# Patient Record
Sex: FEMALE | Race: OTHER | HISPANIC OR LATINO
[De-identification: names, ages, dates, MRNs, and addresses within clinical notes are randomized per-mention and may not be internally consistent; named-entity substitution may affect disease eponyms.]

---

## 2020-08-19 PROBLEM — Z00.00 ENCOUNTER FOR PREVENTIVE HEALTH EXAMINATION: Status: ACTIVE | Noted: 2020-08-19

## 2020-08-20 ENCOUNTER — APPOINTMENT (OUTPATIENT)
Dept: INTERVENTIONAL RADIOLOGY/VASCULAR | Facility: CLINIC | Age: 35
End: 2020-08-20
Payer: COMMERCIAL

## 2020-08-20 ENCOUNTER — OUTPATIENT (OUTPATIENT)
Dept: OUTPATIENT SERVICES | Facility: HOSPITAL | Age: 35
LOS: 1 days | End: 2020-08-20

## 2020-08-20 DIAGNOSIS — Z00.8 ENCOUNTER FOR OTHER GENERAL EXAMINATION: ICD-10-CM

## 2020-08-20 PROCEDURE — 58340 CATHETER FOR HYSTEROGRAPHY: CPT

## 2020-08-20 PROCEDURE — 74740 X-RAY FEMALE GENITAL TRACT: CPT | Mod: 26

## 2021-05-11 ENCOUNTER — APPOINTMENT (OUTPATIENT)
Dept: HUMAN REPRODUCTION | Facility: CLINIC | Age: 36
End: 2021-05-11

## 2021-11-18 ENCOUNTER — APPOINTMENT (OUTPATIENT)
Dept: HUMAN REPRODUCTION | Facility: CLINIC | Age: 36
End: 2021-11-18
Payer: COMMERCIAL

## 2021-11-18 PROCEDURE — 76830 TRANSVAGINAL US NON-OB: CPT

## 2021-11-18 PROCEDURE — 36415 COLL VENOUS BLD VENIPUNCTURE: CPT

## 2021-11-18 PROCEDURE — 99205 OFFICE O/P NEW HI 60 MIN: CPT | Mod: 25

## 2021-11-24 ENCOUNTER — APPOINTMENT (OUTPATIENT)
Dept: HUMAN REPRODUCTION | Facility: CLINIC | Age: 36
End: 2021-11-24

## 2021-12-18 ENCOUNTER — RESULT REVIEW (OUTPATIENT)
Age: 36
End: 2021-12-18

## 2021-12-18 ENCOUNTER — APPOINTMENT (OUTPATIENT)
Dept: MRI IMAGING | Facility: CLINIC | Age: 36
End: 2021-12-18
Payer: COMMERCIAL

## 2021-12-18 ENCOUNTER — OUTPATIENT (OUTPATIENT)
Dept: OUTPATIENT SERVICES | Facility: HOSPITAL | Age: 36
LOS: 1 days | End: 2021-12-18
Payer: COMMERCIAL

## 2021-12-18 DIAGNOSIS — Z00.8 ENCOUNTER FOR OTHER GENERAL EXAMINATION: ICD-10-CM

## 2021-12-18 PROCEDURE — 70553 MRI BRAIN STEM W/O & W/DYE: CPT

## 2021-12-18 PROCEDURE — 70553 MRI BRAIN STEM W/O & W/DYE: CPT | Mod: 26

## 2021-12-18 PROCEDURE — A9585: CPT

## 2022-01-03 ENCOUNTER — APPOINTMENT (OUTPATIENT)
Dept: HUMAN REPRODUCTION | Facility: CLINIC | Age: 37
End: 2022-01-03
Payer: COMMERCIAL

## 2022-01-03 PROCEDURE — 36415 COLL VENOUS BLD VENIPUNCTURE: CPT

## 2022-01-06 ENCOUNTER — APPOINTMENT (OUTPATIENT)
Dept: HUMAN REPRODUCTION | Facility: CLINIC | Age: 37
End: 2022-01-06
Payer: COMMERCIAL

## 2022-01-06 PROCEDURE — 58340 CATHETER FOR HYSTEROGRAPHY: CPT

## 2022-01-06 PROCEDURE — 99072 ADDL SUPL MATRL&STAF TM PHE: CPT

## 2022-01-06 PROCEDURE — 76831 ECHO EXAM UTERUS: CPT

## 2022-01-06 PROCEDURE — 58999I: CUSTOM

## 2022-01-10 ENCOUNTER — TRANSCRIPTION ENCOUNTER (OUTPATIENT)
Age: 37
End: 2022-01-10

## 2022-01-14 ENCOUNTER — APPOINTMENT (OUTPATIENT)
Dept: MRI IMAGING | Facility: CLINIC | Age: 37
End: 2022-01-14

## 2022-01-25 ENCOUNTER — APPOINTMENT (OUTPATIENT)
Dept: MRI IMAGING | Facility: CLINIC | Age: 37
End: 2022-01-25
Payer: COMMERCIAL

## 2022-01-25 ENCOUNTER — RESULT REVIEW (OUTPATIENT)
Age: 37
End: 2022-01-25

## 2022-01-25 ENCOUNTER — OUTPATIENT (OUTPATIENT)
Dept: OUTPATIENT SERVICES | Facility: HOSPITAL | Age: 37
LOS: 1 days | End: 2022-01-25
Payer: COMMERCIAL

## 2022-01-25 DIAGNOSIS — Z00.8 ENCOUNTER FOR OTHER GENERAL EXAMINATION: ICD-10-CM

## 2022-01-25 PROCEDURE — 72197 MRI PELVIS W/O & W/DYE: CPT

## 2022-01-25 PROCEDURE — A9585: CPT

## 2022-01-25 PROCEDURE — 72197 MRI PELVIS W/O & W/DYE: CPT | Mod: 26

## 2022-02-04 ENCOUNTER — APPOINTMENT (OUTPATIENT)
Dept: OBGYN | Facility: CLINIC | Age: 37
End: 2022-02-04
Payer: COMMERCIAL

## 2022-02-04 VITALS
HEIGHT: 62 IN | DIASTOLIC BLOOD PRESSURE: 62 MMHG | SYSTOLIC BLOOD PRESSURE: 112 MMHG | BODY MASS INDEX: 24.66 KG/M2 | WEIGHT: 134 LBS

## 2022-02-04 DIAGNOSIS — Z87.42 PERSONAL HISTORY OF OTHER DISEASES OF THE FEMALE GENITAL TRACT: ICD-10-CM

## 2022-02-04 DIAGNOSIS — Z86.2 PERSONAL HISTORY OF DISEASES OF THE BLOOD AND BLOOD-FORMING ORGANS AND CERTAIN DISORDERS INVOLVING THE IMMUNE MECHANISM: ICD-10-CM

## 2022-02-04 DIAGNOSIS — Z86.018 PERSONAL HISTORY OF OTHER BENIGN NEOPLASM: ICD-10-CM

## 2022-02-04 PROCEDURE — 99243 OFF/OP CNSLTJ NEW/EST LOW 30: CPT

## 2022-02-04 RX ORDER — CABERGOLINE 0.5 MG/1
TABLET ORAL
Refills: 0 | Status: ACTIVE | COMMUNITY

## 2022-02-04 NOTE — HISTORY OF PRESENT ILLNESS
[FreeTextEntry1] : presents for consultation for fibroid uterus. has been undergoing fertility treatments for over a year and has had multiple procedures for her fibroids. \par \par \par 2015 open cystectomy\par 2019 open myomectomy x 10 fibroids - 5 large, 5 small\par 2021 x2 [facility] sm[facility]  dr willard\par 2021 [facility] and robotic surgery - fibroids and endo  dr. lyubov HARRELLU [facility]\par \par now with 3.6 sm fibroid on mri and post surgical changes\par \par \par \par  [TextBox_4] : Consult regarding fibroids [PapSmeardate] : 7/2020 [LMPDate] : 1/3/22

## 2022-02-04 NOTE — PLAN
[FreeTextEntry1] : Discussed the option of continued conservative observation of fibroids vs surgical removal. Treatment options of myomectomy, hysterectomy, ufe and continued observation were also outlined. A detailed discussion regarding the option of myomectomy vs hysterectomy was also held and the risks, benefits, and alternatives provided. All questions answered and pt to notify.\par \par will d/w brandon\par has a small prolactinoma and started cabergoline \par has had no pregnancies in the past

## 2022-02-09 ENCOUNTER — NON-APPOINTMENT (OUTPATIENT)
Age: 37
End: 2022-02-09

## 2022-02-22 ENCOUNTER — OUTPATIENT (OUTPATIENT)
Dept: OUTPATIENT SERVICES | Facility: HOSPITAL | Age: 37
LOS: 1 days | End: 2022-02-22
Payer: COMMERCIAL

## 2022-02-22 VITALS
HEART RATE: 75 BPM | OXYGEN SATURATION: 100 % | HEIGHT: 61 IN | SYSTOLIC BLOOD PRESSURE: 115 MMHG | WEIGHT: 130.07 LBS | TEMPERATURE: 98 F | DIASTOLIC BLOOD PRESSURE: 76 MMHG | RESPIRATION RATE: 20 BRPM

## 2022-02-22 DIAGNOSIS — D25.9 LEIOMYOMA OF UTERUS, UNSPECIFIED: ICD-10-CM

## 2022-02-22 DIAGNOSIS — Z98.890 OTHER SPECIFIED POSTPROCEDURAL STATES: Chronic | ICD-10-CM

## 2022-02-22 DIAGNOSIS — Z01.818 ENCOUNTER FOR OTHER PREPROCEDURAL EXAMINATION: ICD-10-CM

## 2022-02-22 DIAGNOSIS — D64.9 ANEMIA, UNSPECIFIED: ICD-10-CM

## 2022-02-22 DIAGNOSIS — Z90.710 ACQUIRED ABSENCE OF BOTH CERVIX AND UTERUS: Chronic | ICD-10-CM

## 2022-02-22 LAB
ANION GAP SERPL CALC-SCNC: 12 MMOL/L — SIGNIFICANT CHANGE UP (ref 5–17)
BLD GP AB SCN SERPL QL: NEGATIVE — SIGNIFICANT CHANGE UP
BUN SERPL-MCNC: 7 MG/DL — SIGNIFICANT CHANGE UP (ref 7–23)
CALCIUM SERPL-MCNC: 9.6 MG/DL — SIGNIFICANT CHANGE UP (ref 8.4–10.5)
CHLORIDE SERPL-SCNC: 105 MMOL/L — SIGNIFICANT CHANGE UP (ref 96–108)
CO2 SERPL-SCNC: 23 MMOL/L — SIGNIFICANT CHANGE UP (ref 22–31)
CREAT SERPL-MCNC: 0.69 MG/DL — SIGNIFICANT CHANGE UP (ref 0.5–1.3)
GLUCOSE SERPL-MCNC: 107 MG/DL — HIGH (ref 70–99)
HCT VFR BLD CALC: 42.4 % — SIGNIFICANT CHANGE UP (ref 34.5–45)
HGB BLD-MCNC: 13.8 G/DL — SIGNIFICANT CHANGE UP (ref 11.5–15.5)
MCHC RBC-ENTMCNC: 28.8 PG — SIGNIFICANT CHANGE UP (ref 27–34)
MCHC RBC-ENTMCNC: 32.5 GM/DL — SIGNIFICANT CHANGE UP (ref 32–36)
MCV RBC AUTO: 88.3 FL — SIGNIFICANT CHANGE UP (ref 80–100)
NRBC # BLD: 0 /100 WBCS — SIGNIFICANT CHANGE UP (ref 0–0)
PLATELET # BLD AUTO: 202 K/UL — SIGNIFICANT CHANGE UP (ref 150–400)
POTASSIUM SERPL-MCNC: 4 MMOL/L — SIGNIFICANT CHANGE UP (ref 3.5–5.3)
POTASSIUM SERPL-SCNC: 4 MMOL/L — SIGNIFICANT CHANGE UP (ref 3.5–5.3)
RBC # BLD: 4.8 M/UL — SIGNIFICANT CHANGE UP (ref 3.8–5.2)
RBC # FLD: 16.6 % — HIGH (ref 10.3–14.5)
RH IG SCN BLD-IMP: POSITIVE — SIGNIFICANT CHANGE UP
SODIUM SERPL-SCNC: 140 MMOL/L — SIGNIFICANT CHANGE UP (ref 135–145)
WBC # BLD: 4.76 K/UL — SIGNIFICANT CHANGE UP (ref 3.8–10.5)
WBC # FLD AUTO: 4.76 K/UL — SIGNIFICANT CHANGE UP (ref 3.8–10.5)

## 2022-02-22 PROCEDURE — 80048 BASIC METABOLIC PNL TOTAL CA: CPT

## 2022-02-22 PROCEDURE — 86850 RBC ANTIBODY SCREEN: CPT

## 2022-02-22 PROCEDURE — G0463: CPT

## 2022-02-22 PROCEDURE — 86900 BLOOD TYPING SEROLOGIC ABO: CPT

## 2022-02-22 PROCEDURE — 36415 COLL VENOUS BLD VENIPUNCTURE: CPT

## 2022-02-22 PROCEDURE — 85027 COMPLETE CBC AUTOMATED: CPT

## 2022-02-22 PROCEDURE — 86901 BLOOD TYPING SEROLOGIC RH(D): CPT

## 2022-02-22 RX ORDER — SODIUM CHLORIDE 9 MG/ML
1000 INJECTION, SOLUTION INTRAVENOUS
Refills: 0 | Status: DISCONTINUED | OUTPATIENT
Start: 2022-03-15 | End: 2022-03-29

## 2022-02-22 RX ORDER — LIDOCAINE HCL 20 MG/ML
0.2 VIAL (ML) INJECTION ONCE
Refills: 0 | Status: DISCONTINUED | OUTPATIENT
Start: 2022-03-15 | End: 2022-03-29

## 2022-02-22 NOTE — H&P PST ADULT - HISTORY OF PRESENT ILLNESS
36 year old female with h/o uterine fibroid, s/p laparoscopic procedure for  36 year old female with h/o uterine fibroid, s/p laparoscopic hysteroscopy for fibroid resection 10/2021, states "they grew back," presents for preop testing today for D&C/ non obstetrical operative hysteroscopy/ resection of submucous fibroid on 3/15/2022. Patient c/o mild lower back pain, no abdominal discomfort, no n/v.

## 2022-02-22 NOTE — H&P PST ADULT - PROBLEM SELECTOR PLAN 1
Scheduled for D&C/ non obstetrical / operative hysteroscopy/ resection of submucous fibroid   preop instruction discussed and patient verbalized understanding   ucg on admit, urine cup given

## 2022-02-22 NOTE — H&P PST ADULT - NSICDXPASTSURGICALHX_GEN_ALL_CORE_FT
PAST SURGICAL HISTORY:  H/O ovarian cystectomy 10/2019    H/O: hysterectomy 3/2021  5/2021    S/P laparoscopic procedure for fibroid 10/2021

## 2022-02-22 NOTE — H&P PST ADULT - ATTENDING COMMENTS
pt seen and plan discussed. to proceed with scheduled procedure. all questions answered. informed consent signed and witnessed.    gideon rose

## 2022-02-22 NOTE — H&P PST ADULT - NSICDXPASTMEDICALHX_GEN_ALL_CORE_FT
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

## 2022-02-22 NOTE — H&P PST ADULT - AGENT'S NAME
1. Have you been to the ER, urgent care clinic since your last visit? Hospitalized since your last visit?no    2. Have you seen or consulted any other health care providers outside of the [address] [place] [address] since your last visit? Include any pap smears or colon screening. no           Body Mass Index: Care Instructions  Your Care Instructions    Body mass index (BMI) can help you see if your weight is raising your risk for health problems. It uses a formula to compare how much you weigh with how tall you are. · A BMI lower than 18.5 is considered underweight. · A BMI between 18.5 and 24.9 is considered healthy. · A BMI between 25 and 29.9 is considered overweight. A BMI of 30 or higher is considered obese. If your BMI is in the normal range, it means that you have a lower risk for weight-related health problems. If your BMI is in the overweight or obese range, you may be at increased risk for weight-related health problems, such as high blood pressure, heart disease, stroke, arthritis or joint pain, and diabetes. If your BMI is in the underweight range, you may be at increased risk for health problems such as fatigue, lower protection (immunity) against illness, muscle loss, bone loss, hair loss, and hormone problems. BMI is just one measure of your risk for weight-related health problems. You may be at higher risk for health problems if you are not active, you eat an unhealthy diet, or you drink too much alcohol or use tobacco products. Follow-up care is a key part of your treatment and safety. Be sure to make and go to all appointments, and call your doctor if you are having problems. It's also a good idea to know your test results and keep a list of the medicines you take. How can you care for yourself at home? · Practice healthy eating habits. This includes eating plenty of fruits, vegetables, whole grains, lean protein, and low-fat dairy. · If your doctor recommends it, get more exercise. Walking is a good choice. Bit by bit, increase the amount you walk every day. Try for at least 30 minutes on most days of the week. · Do not smoke. Smoking can increase your risk for health problems. If you need help quitting, talk to your doctor about stop-smoking programs and medicines. These can increase your chances of quitting for good. · Limit alcohol to 2 drinks a day for men and 1 drink a day for women. Too much alcohol can cause health problems. If you have a BMI higher than 25  · Your doctor may do other tests to check your risk for weight-related health problems. This may include measuring the distance around your waist. A waist measurement of more than 40 inches in men or 35 inches in women can increase the risk of weight-related health problems. · Talk with your doctor about steps you can take to stay healthy or improve your health. You may need to make lifestyle changes to lose weight and stay healthy, such as changing your diet and getting regular exercise. If you have a BMI lower than 18.5  · Your doctor may do other tests to check your risk for health problems. · Talk with your doctor about steps you can take to stay healthy or improve your health. You may need to make lifestyle changes to gain or maintain weight and stay healthy, such as getting more healthy foods in your diet and doing exercises to build muscle. Where can you learn more? Go to http://ang-rebecca.info/. Enter S176 in the search box to learn more about \"Body Mass Index: Care Instructions. \"  Current as of: October 13, 2016  Content Version: 11.4  © 7212-2387 [employer]. Care instructions adapted under license by [employer] (which disclaims liability or warranty for this information).  If you have questions about a medical condition or this instruction, always ask your healthcare professional. [place] [address] any warranty or liability for your use of this information. Martell Ramos

## 2022-02-25 PROBLEM — E23.6 OTHER DISORDERS OF PITUITARY GLAND: Chronic | Status: ACTIVE | Noted: 2022-02-22

## 2022-02-25 PROBLEM — D64.9 ANEMIA, UNSPECIFIED: Chronic | Status: ACTIVE | Noted: 2022-02-22

## 2022-02-25 PROBLEM — D25.9 LEIOMYOMA OF UTERUS, UNSPECIFIED: Chronic | Status: ACTIVE | Noted: 2022-02-22

## 2022-03-04 ENCOUNTER — APPOINTMENT (OUTPATIENT)
Dept: OBGYN | Facility: CLINIC | Age: 37
End: 2022-03-04

## 2022-03-12 ENCOUNTER — OUTPATIENT (OUTPATIENT)
Dept: OUTPATIENT SERVICES | Facility: HOSPITAL | Age: 37
LOS: 1 days | End: 2022-03-12
Payer: COMMERCIAL

## 2022-03-12 DIAGNOSIS — Z11.52 ENCOUNTER FOR SCREENING FOR COVID-19: ICD-10-CM

## 2022-03-12 DIAGNOSIS — Z98.890 OTHER SPECIFIED POSTPROCEDURAL STATES: Chronic | ICD-10-CM

## 2022-03-12 DIAGNOSIS — Z90.710 ACQUIRED ABSENCE OF BOTH CERVIX AND UTERUS: Chronic | ICD-10-CM

## 2022-03-12 LAB — SARS-COV-2 RNA SPEC QL NAA+PROBE: SIGNIFICANT CHANGE UP

## 2022-03-12 PROCEDURE — U0005: CPT

## 2022-03-12 PROCEDURE — U0003: CPT

## 2022-03-12 PROCEDURE — C9803: CPT

## 2022-03-14 ENCOUNTER — TRANSCRIPTION ENCOUNTER (OUTPATIENT)
Age: 37
End: 2022-03-14

## 2022-03-15 ENCOUNTER — OUTPATIENT (OUTPATIENT)
Dept: OUTPATIENT SERVICES | Facility: HOSPITAL | Age: 37
LOS: 1 days | End: 2022-03-15
Payer: COMMERCIAL

## 2022-03-15 ENCOUNTER — RESULT REVIEW (OUTPATIENT)
Age: 37
End: 2022-03-15

## 2022-03-15 ENCOUNTER — APPOINTMENT (OUTPATIENT)
Dept: OBGYN | Facility: HOSPITAL | Age: 37
End: 2022-03-15

## 2022-03-15 VITALS
OXYGEN SATURATION: 100 % | DIASTOLIC BLOOD PRESSURE: 75 MMHG | TEMPERATURE: 98 F | RESPIRATION RATE: 15 BRPM | SYSTOLIC BLOOD PRESSURE: 108 MMHG | WEIGHT: 130.07 LBS | HEIGHT: 61 IN | HEART RATE: 83 BPM

## 2022-03-15 VITALS
DIASTOLIC BLOOD PRESSURE: 70 MMHG | TEMPERATURE: 97 F | SYSTOLIC BLOOD PRESSURE: 111 MMHG | OXYGEN SATURATION: 100 % | HEART RATE: 106 BPM | RESPIRATION RATE: 14 BRPM

## 2022-03-15 DIAGNOSIS — Z98.890 OTHER SPECIFIED POSTPROCEDURAL STATES: Chronic | ICD-10-CM

## 2022-03-15 DIAGNOSIS — Z90.710 ACQUIRED ABSENCE OF BOTH CERVIX AND UTERUS: Chronic | ICD-10-CM

## 2022-03-15 DIAGNOSIS — D25.9 LEIOMYOMA OF UTERUS, UNSPECIFIED: ICD-10-CM

## 2022-03-15 LAB — RH IG SCN BLD-IMP: POSITIVE — SIGNIFICANT CHANGE UP

## 2022-03-15 PROCEDURE — 58561 HYSTEROSCOPY REMOVE MYOMA: CPT

## 2022-03-15 PROCEDURE — 88305 TISSUE EXAM BY PATHOLOGIST: CPT | Mod: 26

## 2022-03-15 PROCEDURE — 88305 TISSUE EXAM BY PATHOLOGIST: CPT

## 2022-03-15 RX ORDER — OXYCODONE HYDROCHLORIDE 5 MG/1
5 TABLET ORAL ONCE
Refills: 0 | Status: DISCONTINUED | OUTPATIENT
Start: 2022-03-15 | End: 2022-03-15

## 2022-03-15 RX ORDER — CHOLECALCIFEROL (VITAMIN D3) 125 MCG
1 CAPSULE ORAL
Qty: 0 | Refills: 0 | DISCHARGE

## 2022-03-15 RX ORDER — IBUPROFEN 200 MG
600 TABLET ORAL ONCE
Refills: 0 | Status: COMPLETED | OUTPATIENT
Start: 2022-03-15 | End: 2022-03-15

## 2022-03-15 RX ORDER — APREPITANT 80 MG/1
40 CAPSULE ORAL ONCE
Refills: 0 | Status: COMPLETED | OUTPATIENT
Start: 2022-03-15 | End: 2022-03-15

## 2022-03-15 RX ORDER — ONDANSETRON 8 MG/1
4 TABLET, FILM COATED ORAL ONCE
Refills: 0 | Status: DISCONTINUED | OUTPATIENT
Start: 2022-03-15 | End: 2022-03-29

## 2022-03-15 RX ORDER — PROGESTERONE 200 MG/1
1 CAPSULE, LIQUID FILLED ORAL
Qty: 7 | Refills: 0
Start: 2022-03-15 | End: 2022-03-21

## 2022-03-15 RX ADMIN — APREPITANT 40 MILLIGRAM(S): 80 CAPSULE ORAL at 14:55

## 2022-03-15 RX ADMIN — Medication 600 MILLIGRAM(S): at 17:33

## 2022-03-15 NOTE — ASU PATIENT PROFILE, ADULT - VISION (WITH CORRECTIVE LENSES IF THE PATIENT USUALLY WEARS THEM):
glasses glasses at home/Normal vision: sees adequately in most situations; can see medication labels, newsprint

## 2022-03-15 NOTE — BRIEF OPERATIVE NOTE - OPERATION/FINDINGS
EUA: NEFG, mobile uterus, nonpalpable adnexa b/l  HSC: normal endocervical canal; submucous fibroid at fundus and left lateral uterine wall; endometrial adhesions resected; ostia wnl b/l; intact endometrial cavity

## 2022-03-15 NOTE — ASU DISCHARGE PLAN (ADULT/PEDIATRIC) - CARE PROVIDER_API CALL
Janet Santoro)  Obstetrics and Gynecology  [address] [place] [address]  Phone: [phone]  Fax: [phone]  Follow Up Time:

## 2022-03-15 NOTE — ASU DISCHARGE PLAN (ADULT/PEDIATRIC) - NURSING INSTRUCTIONS
Next dose of Tylenol will be on or after 10p_ ,today/tonight, If needed for pain/cramps. Your first dose of Tylenol was given at 4p_. Do not exceed more than 4000mg of Tylenol in one 24 hour setting.

## 2022-03-15 NOTE — BRIEF OPERATIVE NOTE - NSICDXBRIEFPROCEDURE_GEN_ALL_CORE_FT
PROCEDURES:  Dilation and curettage, uterus, with operative hysteroscopy and submucous leiomyoma excision 15-Mar-2022 17:36:54  Merry Vasquez

## 2022-03-15 NOTE — ASU PATIENT PROFILE, ADULT - LANGUAGE ASSISTANCE NEEDED
No-Patient/Caregiver offered and refused free interpretation services. admitting nurse speaks Indonesian

## 2022-03-15 NOTE — ASU DISCHARGE PLAN (ADULT/PEDIATRIC) - NS MD DC FALL RISK RISK
For information on Fall & Injury Prevention, visit: [email or web address] OR  [email or web address] OR  https://www.cdc.gov/steadi/patient.html

## 2022-03-15 NOTE — ASU DISCHARGE PLAN (ADULT/PEDIATRIC) - ASU DC SPECIAL INSTRUCTIONSFT
Regular diet as tolerated, regular activity as tolerated, no heavy lifting for first two weeks.  Nothing per vagina: no intercourse, tampons or douching.  Call your provider if you experience fevers, chills, worsening abdominal pain, inability to urinate or worsening vaginal bleeding.  Follow up with your provider in 2 weeks. Regular diet as tolerated, regular activity as tolerated, no heavy lifting for first two weeks.  Nothing per vagina: no intercourse, tampons or douching.  Call your provider if you experience fevers, chills, worsening abdominal pain, inability to urinate or worsening vaginal bleeding.    Follow up with your provider in 2 weeks.    Use your prescription for Estrace 2mg once a day for 30 days.   Use your prescription for Prometrium 200mg once a day for the last 7 days you use Estrace (days 24-30).

## 2022-03-15 NOTE — ASU PATIENT PROFILE, ADULT - NSICDXPASTMEDICALHX_GEN_ALL_CORE_FT
PAST MEDICAL HISTORY:  Anemia blood transfusion 2019  due menorrhagia    Rathke's cleft cyst benign   incidental finding on MRI    Uterine fibroid

## 2022-03-15 NOTE — ASU PATIENT PROFILE, ADULT - FALL HARM RISK - UNIVERSAL INTERVENTIONS
Bed in lowest position, wheels locked, appropriate side rails in place/Call bell, personal items and telephone in reach/Instruct patient to call for assistance before getting out of bed or chair/Non-slip footwear when patient is out of bed/[place] to call system/Physically safe environment - no spills, clutter or unnecessary equipment/Purposeful Proactive Rounding/Room/bathroom lighting operational, light cord in reach

## 2022-03-24 LAB — SURGICAL PATHOLOGY STUDY: SIGNIFICANT CHANGE UP

## 2022-03-29 ENCOUNTER — APPOINTMENT (OUTPATIENT)
Dept: OBGYN | Facility: CLINIC | Age: 37
End: 2022-03-29
Payer: COMMERCIAL

## 2022-03-29 VITALS
DIASTOLIC BLOOD PRESSURE: 76 MMHG | WEIGHT: 134 LBS | BODY MASS INDEX: 24.66 KG/M2 | SYSTOLIC BLOOD PRESSURE: 104 MMHG | HEIGHT: 62 IN

## 2022-03-29 PROCEDURE — 99212 OFFICE O/P EST SF 10 MIN: CPT

## 2022-04-05 NOTE — HISTORY OF PRESENT ILLNESS
[Pain is well-controlled] : pain is well-controlled [None] : no vaginal bleeding [Pathology reviewed] : pathology reviewed [de-identified] : Hysteroscopic submucous myoma resection.\par \par  [de-identified] : FREDRICK DANG is a 36 year old presenting for post op. Doing well and hasn't started progestin, still on estradiol only.

## 2022-04-05 NOTE — END OF VISIT
[FreeTextEntry3] : I, Jeannafernando Alvarado, acted solely as a scribe for Dr. Janet Santoro, on 03/29/2022. \par \par All medical record entries made by the scribe were at my, Dr. Janet Santoro., direction and personally dictated by me on 03/29/2022. I have personally reviewed the chart and agree that the record accurately reflects my personal performance of the history, physical exam, assessment and plan.\par

## 2022-04-18 ENCOUNTER — APPOINTMENT (OUTPATIENT)
Dept: HUMAN REPRODUCTION | Facility: CLINIC | Age: 37
End: 2022-04-18
Payer: COMMERCIAL

## 2022-04-18 PROCEDURE — 58340 CATHETER FOR HYSTEROGRAPHY: CPT

## 2022-04-18 PROCEDURE — 58999I: CUSTOM

## 2022-04-18 PROCEDURE — 76831 ECHO EXAM UTERUS: CPT

## 2022-04-28 ENCOUNTER — APPOINTMENT (OUTPATIENT)
Dept: HUMAN REPRODUCTION | Facility: CLINIC | Age: 37
End: 2022-04-28
Payer: COMMERCIAL

## 2022-04-28 PROCEDURE — 99215 OFFICE O/P EST HI 40 MIN: CPT | Mod: 95

## 2022-05-18 ENCOUNTER — APPOINTMENT (OUTPATIENT)
Dept: HUMAN REPRODUCTION | Facility: CLINIC | Age: 37
End: 2022-05-18
Payer: COMMERCIAL

## 2022-05-18 PROCEDURE — 58340 CATHETER FOR HYSTEROGRAPHY: CPT

## 2022-05-18 PROCEDURE — 76831 ECHO EXAM UTERUS: CPT

## 2022-06-24 ENCOUNTER — APPOINTMENT (OUTPATIENT)
Dept: HUMAN REPRODUCTION | Facility: CLINIC | Age: 37
End: 2022-06-24

## 2022-06-24 PROCEDURE — 76830 TRANSVAGINAL US NON-OB: CPT

## 2022-06-24 PROCEDURE — 99213 OFFICE O/P EST LOW 20 MIN: CPT | Mod: 25

## 2022-09-15 ENCOUNTER — APPOINTMENT (OUTPATIENT)
Dept: HUMAN REPRODUCTION | Facility: CLINIC | Age: 37
End: 2022-09-15

## 2022-09-15 PROCEDURE — 36415 COLL VENOUS BLD VENIPUNCTURE: CPT

## 2022-09-15 PROCEDURE — 99213 OFFICE O/P EST LOW 20 MIN: CPT | Mod: 25

## 2022-09-15 PROCEDURE — 76830 TRANSVAGINAL US NON-OB: CPT

## 2022-09-22 ENCOUNTER — APPOINTMENT (OUTPATIENT)
Dept: HUMAN REPRODUCTION | Facility: CLINIC | Age: 37
End: 2022-09-22

## 2022-09-22 PROCEDURE — 36415 COLL VENOUS BLD VENIPUNCTURE: CPT

## 2022-09-22 PROCEDURE — 76830 TRANSVAGINAL US NON-OB: CPT

## 2022-09-22 PROCEDURE — 99213 OFFICE O/P EST LOW 20 MIN: CPT | Mod: 25

## 2022-09-26 ENCOUNTER — APPOINTMENT (OUTPATIENT)
Dept: HUMAN REPRODUCTION | Facility: CLINIC | Age: 37
End: 2022-09-26

## 2022-09-26 PROCEDURE — 99213 OFFICE O/P EST LOW 20 MIN: CPT | Mod: 25

## 2022-09-26 PROCEDURE — 76830 TRANSVAGINAL US NON-OB: CPT

## 2022-09-26 PROCEDURE — 36415 COLL VENOUS BLD VENIPUNCTURE: CPT

## 2022-09-29 ENCOUNTER — APPOINTMENT (OUTPATIENT)
Dept: HUMAN REPRODUCTION | Facility: CLINIC | Age: 37
End: 2022-09-29

## 2022-09-29 PROCEDURE — 76830 TRANSVAGINAL US NON-OB: CPT

## 2022-09-29 PROCEDURE — 36415 COLL VENOUS BLD VENIPUNCTURE: CPT

## 2022-09-29 PROCEDURE — 99213 OFFICE O/P EST LOW 20 MIN: CPT | Mod: 25

## 2022-10-02 ENCOUNTER — APPOINTMENT (OUTPATIENT)
Dept: HUMAN REPRODUCTION | Facility: CLINIC | Age: 37
End: 2022-10-02

## 2022-10-02 PROCEDURE — 99213 OFFICE O/P EST LOW 20 MIN: CPT | Mod: 25

## 2022-10-02 PROCEDURE — 36415 COLL VENOUS BLD VENIPUNCTURE: CPT

## 2022-10-02 PROCEDURE — 76830 TRANSVAGINAL US NON-OB: CPT

## 2022-10-04 ENCOUNTER — APPOINTMENT (OUTPATIENT)
Dept: HUMAN REPRODUCTION | Facility: CLINIC | Age: 37
End: 2022-10-04

## 2022-10-04 PROCEDURE — 76830 TRANSVAGINAL US NON-OB: CPT

## 2022-10-04 PROCEDURE — 99213 OFFICE O/P EST LOW 20 MIN: CPT | Mod: 25

## 2022-10-04 PROCEDURE — 36415 COLL VENOUS BLD VENIPUNCTURE: CPT

## 2022-10-05 ENCOUNTER — APPOINTMENT (OUTPATIENT)
Dept: HUMAN REPRODUCTION | Facility: CLINIC | Age: 37
End: 2022-10-05

## 2022-10-05 PROCEDURE — 36415 COLL VENOUS BLD VENIPUNCTURE: CPT

## 2022-10-06 ENCOUNTER — APPOINTMENT (OUTPATIENT)
Dept: HUMAN REPRODUCTION | Facility: CLINIC | Age: 37
End: 2022-10-06

## 2022-10-06 PROCEDURE — 89250 CULTR OOCYTE/EMBRYO <4 DAYS: CPT

## 2022-10-06 PROCEDURE — 58970 RETRIEVAL OF OOCYTE: CPT

## 2022-10-06 PROCEDURE — 76948 ECHO GUIDE OVA ASPIRATION: CPT

## 2022-10-06 PROCEDURE — 89261 SPERM ISOLATION COMPLEX: CPT

## 2022-10-06 PROCEDURE — 89281 ASSIST OOCYTE FERTILIZATION: CPT

## 2022-10-06 PROCEDURE — 89254 OOCYTE IDENTIFICATION: CPT

## 2022-10-09 PROCEDURE — 89253 EMBRYO HATCHING: CPT

## 2022-10-11 ENCOUNTER — APPOINTMENT (OUTPATIENT)
Dept: HUMAN REPRODUCTION | Facility: CLINIC | Age: 37
End: 2022-10-11

## 2022-10-11 PROCEDURE — 89272 EXTENDED CULTURE OF OOCYTES: CPT

## 2022-10-11 PROCEDURE — 89258 CRYOPRESERVATION EMBRYO(S): CPT

## 2022-10-11 PROCEDURE — 58974 EMBRYO TRANSFER INTRAUTERINE: CPT

## 2022-10-11 PROCEDURE — 89342 STORAGE/YEAR EMBRYO(S): CPT

## 2022-10-11 PROCEDURE — 76705 ECHO EXAM OF ABDOMEN: CPT

## 2022-10-11 PROCEDURE — 89290 BIOPSY OOCYTE POLAR BODY <=5: CPT

## 2022-10-21 ENCOUNTER — APPOINTMENT (OUTPATIENT)
Dept: HUMAN REPRODUCTION | Facility: CLINIC | Age: 37
End: 2022-10-21

## 2022-10-21 PROCEDURE — 36415 COLL VENOUS BLD VENIPUNCTURE: CPT

## 2022-10-24 ENCOUNTER — APPOINTMENT (OUTPATIENT)
Dept: HUMAN REPRODUCTION | Facility: CLINIC | Age: 37
End: 2022-10-24

## 2022-10-24 PROCEDURE — 36415 COLL VENOUS BLD VENIPUNCTURE: CPT

## 2022-10-28 ENCOUNTER — RESULT REVIEW (OUTPATIENT)
Age: 37
End: 2022-10-28

## 2022-10-28 ENCOUNTER — APPOINTMENT (OUTPATIENT)
Dept: HUMAN REPRODUCTION | Facility: CLINIC | Age: 37
End: 2022-10-28

## 2022-10-28 ENCOUNTER — OUTPATIENT (OUTPATIENT)
Dept: OUTPATIENT SERVICES | Facility: HOSPITAL | Age: 37
LOS: 1 days | End: 2022-10-28
Payer: COMMERCIAL

## 2022-10-28 DIAGNOSIS — O00.01 ABDOMINAL PREGNANCY WITH INTRAUTERINE PREGNANCY: ICD-10-CM

## 2022-10-28 DIAGNOSIS — Z90.710 ACQUIRED ABSENCE OF BOTH CERVIX AND UTERUS: Chronic | ICD-10-CM

## 2022-10-28 DIAGNOSIS — Z98.890 OTHER SPECIFIED POSTPROCEDURAL STATES: Chronic | ICD-10-CM

## 2022-10-28 DIAGNOSIS — Z00.00 ENCOUNTER FOR GENERAL ADULT MEDICAL EXAMINATION WITHOUT ABNORMAL FINDINGS: ICD-10-CM

## 2022-10-28 PROCEDURE — 36415 COLL VENOUS BLD VENIPUNCTURE: CPT

## 2022-10-28 PROCEDURE — 76817 TRANSVAGINAL US OBSTETRIC: CPT

## 2022-10-28 PROCEDURE — 99213 OFFICE O/P EST LOW 20 MIN: CPT | Mod: 25

## 2022-10-28 PROCEDURE — 76817 TRANSVAGINAL US OBSTETRIC: CPT | Mod: 26

## 2022-10-31 ENCOUNTER — APPOINTMENT (OUTPATIENT)
Dept: HUMAN REPRODUCTION | Facility: CLINIC | Age: 37
End: 2022-10-31

## 2022-10-31 PROCEDURE — 99213 OFFICE O/P EST LOW 20 MIN: CPT | Mod: 25

## 2022-10-31 PROCEDURE — 76817 TRANSVAGINAL US OBSTETRIC: CPT

## 2022-10-31 PROCEDURE — 36415 COLL VENOUS BLD VENIPUNCTURE: CPT

## 2022-11-07 ENCOUNTER — APPOINTMENT (OUTPATIENT)
Dept: HUMAN REPRODUCTION | Facility: CLINIC | Age: 37
End: 2022-11-07

## 2022-11-07 PROCEDURE — 99213 OFFICE O/P EST LOW 20 MIN: CPT | Mod: 25

## 2022-11-07 PROCEDURE — 36415 COLL VENOUS BLD VENIPUNCTURE: CPT

## 2022-11-07 PROCEDURE — 76817 TRANSVAGINAL US OBSTETRIC: CPT

## 2022-11-14 ENCOUNTER — APPOINTMENT (OUTPATIENT)
Dept: HUMAN REPRODUCTION | Facility: CLINIC | Age: 37
End: 2022-11-14

## 2022-11-21 ENCOUNTER — NON-APPOINTMENT (OUTPATIENT)
Age: 37
End: 2022-11-21

## 2022-11-22 ENCOUNTER — APPOINTMENT (OUTPATIENT)
Dept: OBGYN | Facility: CLINIC | Age: 37
End: 2022-11-22

## 2022-11-22 DIAGNOSIS — Z23 ENCOUNTER FOR IMMUNIZATION: ICD-10-CM

## 2022-11-22 DIAGNOSIS — E55.9 VITAMIN D DEFICIENCY, UNSPECIFIED: ICD-10-CM

## 2022-11-22 DIAGNOSIS — Z11.3 ENCOUNTER FOR SCREENING FOR INFECTIONS WITH A PREDOMINANTLY SEXUAL MODE OF TRANSMISSION: ICD-10-CM

## 2022-11-22 DIAGNOSIS — Z34.01 ENCOUNTER FOR SUPERVISION OF NORMAL FIRST PREGNANCY, FIRST TRIMESTER: ICD-10-CM

## 2022-11-22 DIAGNOSIS — N39.0 URINARY TRACT INFECTION, SITE NOT SPECIFIED: ICD-10-CM

## 2022-11-22 DIAGNOSIS — Z01.84 ENCOUNTER FOR ANTIBODY RESPONSE EXAMINATION: ICD-10-CM

## 2022-11-22 PROCEDURE — 90686 IIV4 VACC NO PRSV 0.5 ML IM: CPT

## 2022-11-22 PROCEDURE — 0500F INITIAL PRENATAL CARE VISIT: CPT

## 2022-11-22 PROCEDURE — 36415 COLL VENOUS BLD VENIPUNCTURE: CPT

## 2022-11-22 PROCEDURE — 90471 IMMUNIZATION ADMIN: CPT

## 2022-11-22 PROCEDURE — 76817 TRANSVAGINAL US OBSTETRIC: CPT

## 2022-11-27 LAB
25(OH)D3 SERPL-MCNC: 35 NG/ML
ABO + RH PNL BLD: NORMAL
BACTERIA UR CULT: NORMAL
BASOPHILS # BLD AUTO: 0.02 K/UL
BASOPHILS NFR BLD AUTO: 0.2 %
BLD GP AB SCN SERPL QL: NORMAL
C TRACH RRNA SPEC QL NAA+PROBE: NOT DETECTED
EOSINOPHIL # BLD AUTO: 0.03 K/UL
EOSINOPHIL NFR BLD AUTO: 0.3 %
ESTIMATED AVERAGE GLUCOSE: 111 MG/DL
HBA1C MFR BLD HPLC: 5.5 %
HBV SURFACE AG SER QL: NONREACTIVE
HCT VFR BLD CALC: 40.4 %
HCV AB SER QL: NONREACTIVE
HCV S/CO RATIO: 0.24 S/CO
HGB A MFR BLD: 97.4 %
HGB A2 MFR BLD: 2.6 %
HGB BLD-MCNC: 13.5 G/DL
HGB FRACT BLD-IMP: NORMAL
HIV1+2 AB SPEC QL IA.RAPID: NONREACTIVE
IMM GRANULOCYTES NFR BLD AUTO: 0.3 %
LEAD BLD-MCNC: <1 UG/DL
LYMPHOCYTES # BLD AUTO: 1.36 K/UL
LYMPHOCYTES NFR BLD AUTO: 14.2 %
MAN DIFF?: NORMAL
MCHC RBC-ENTMCNC: 30.9 PG
MCHC RBC-ENTMCNC: 33.4 GM/DL
MCV RBC AUTO: 92.4 FL
MEV IGG FLD QL IA: >300 AU/ML
MEV IGG+IGM SER-IMP: POSITIVE
MONOCYTES # BLD AUTO: 0.67 K/UL
MONOCYTES NFR BLD AUTO: 7 %
MUV AB SER-ACNC: POSITIVE
MUV IGG SER QL IA: >300 AU/ML
N GONORRHOEA RRNA SPEC QL NAA+PROBE: NOT DETECTED
NEUTROPHILS # BLD AUTO: 7.45 K/UL
NEUTROPHILS NFR BLD AUTO: 78 %
PLATELET # BLD AUTO: 184 K/UL
RBC # BLD: 4.37 M/UL
RBC # FLD: 12 %
RUBV IGG FLD-ACNC: 16.8 INDEX
RUBV IGG SER-IMP: POSITIVE
SOURCE AMPLIFICATION: NORMAL
T PALLIDUM AB SER QL IA: NEGATIVE
TSH SERPL-ACNC: 1.97 UIU/ML
VZV AB TITR SER: POSITIVE
VZV IGG SER IF-ACNC: 1552 INDEX
WBC # FLD AUTO: 9.56 K/UL

## 2022-11-28 ENCOUNTER — TRANSCRIPTION ENCOUNTER (OUTPATIENT)
Age: 37
End: 2022-11-28

## 2022-11-30 LAB
B19V IGG SER QL IA: 5.81 INDEX
B19V IGG+IGM SER-IMP: NORMAL
B19V IGG+IGM SER-IMP: POSITIVE
B19V IGM FLD-ACNC: 0.15 INDEX
B19V IGM SER-ACNC: NEGATIVE

## 2022-12-07 ENCOUNTER — NON-APPOINTMENT (OUTPATIENT)
Age: 37
End: 2022-12-07

## 2022-12-07 ENCOUNTER — APPOINTMENT (OUTPATIENT)
Dept: OBGYN | Facility: CLINIC | Age: 37
End: 2022-12-07

## 2022-12-07 VITALS
HEIGHT: 62 IN | WEIGHT: 142 LBS | BODY MASS INDEX: 26.13 KG/M2 | DIASTOLIC BLOOD PRESSURE: 72 MMHG | HEART RATE: 91 BPM | SYSTOLIC BLOOD PRESSURE: 116 MMHG

## 2022-12-07 DIAGNOSIS — O34.11 MATERNAL CARE FOR BENIGN TUMOR OF CORPUS UTERI, FIRST TRIMESTER: ICD-10-CM

## 2022-12-07 DIAGNOSIS — D25.9 MATERNAL CARE FOR BENIGN TUMOR OF CORPUS UTERI, FIRST TRIMESTER: ICD-10-CM

## 2022-12-07 DIAGNOSIS — O09.811 SUPERVISION OF PREGNANCY RESULTING FROM ASSISTED REPRODUCTIVE TECHNOLOGY, FIRST TRIMESTER: ICD-10-CM

## 2022-12-07 DIAGNOSIS — O34.29 MATERNAL CARE DUE TO UTERINE SCAR FROM OTHER PREVIOUS SURGERY: ICD-10-CM

## 2022-12-07 DIAGNOSIS — O09.511 SUPERVISION OF ELDERLY PRIMIGRAVIDA, FIRST TRIMESTER: ICD-10-CM

## 2022-12-07 PROCEDURE — 0502F SUBSEQUENT PRENATAL CARE: CPT

## 2022-12-07 PROCEDURE — 36415 COLL VENOUS BLD VENIPUNCTURE: CPT

## 2022-12-22 ENCOUNTER — APPOINTMENT (OUTPATIENT)
Dept: OBGYN | Facility: CLINIC | Age: 37
End: 2022-12-22
Payer: COMMERCIAL

## 2022-12-22 PROCEDURE — 76814 OB US NUCHAL MEAS ADD-ON: CPT

## 2022-12-22 PROCEDURE — 76802 OB US < 14 WKS ADDL FETUS: CPT | Mod: 59

## 2022-12-22 PROCEDURE — 76801 OB US < 14 WKS SINGLE FETUS: CPT | Mod: 59

## 2022-12-22 PROCEDURE — 0502F SUBSEQUENT PRENATAL CARE: CPT

## 2022-12-22 PROCEDURE — 76813 OB US NUCHAL MEAS 1 GEST: CPT

## 2023-01-18 ENCOUNTER — APPOINTMENT (OUTPATIENT)
Dept: OBGYN | Facility: CLINIC | Age: 38
End: 2023-01-18
Payer: COMMERCIAL

## 2023-01-18 DIAGNOSIS — D25.9 MATERNAL CARE FOR BENIGN TUMOR OF CORPUS UTERI, SECOND TRIMESTER: ICD-10-CM

## 2023-01-18 DIAGNOSIS — O34.12 MATERNAL CARE FOR BENIGN TUMOR OF CORPUS UTERI, SECOND TRIMESTER: ICD-10-CM

## 2023-01-18 PROCEDURE — 0502F SUBSEQUENT PRENATAL CARE: CPT

## 2023-01-19 ENCOUNTER — APPOINTMENT (OUTPATIENT)
Dept: ANTEPARTUM | Facility: CLINIC | Age: 38
End: 2023-01-19
Payer: COMMERCIAL

## 2023-01-19 ENCOUNTER — ASOB RESULT (OUTPATIENT)
Age: 38
End: 2023-01-19

## 2023-01-19 PROCEDURE — 76811 OB US DETAILED SNGL FETUS: CPT

## 2023-01-19 PROCEDURE — 76817 TRANSVAGINAL US OBSTETRIC: CPT

## 2023-01-19 PROCEDURE — 76812 OB US DETAILED ADDL FETUS: CPT | Mod: 59

## 2023-01-24 PROBLEM — O34.12 UTERINE FIBROIDS AFFECTING PREGNANCY IN SECOND TRIMESTER: Status: ACTIVE | Noted: 2023-01-24

## 2023-02-01 ENCOUNTER — APPOINTMENT (OUTPATIENT)
Dept: ANTEPARTUM | Facility: CLINIC | Age: 38
End: 2023-02-01
Payer: COMMERCIAL

## 2023-02-01 ENCOUNTER — ASOB RESULT (OUTPATIENT)
Age: 38
End: 2023-02-01

## 2023-02-01 PROCEDURE — 76816 OB US FOLLOW-UP PER FETUS: CPT

## 2023-02-09 ENCOUNTER — OUTPATIENT (OUTPATIENT)
Dept: OUTPATIENT SERVICES | Age: 38
LOS: 1 days | Discharge: HOME | End: 2023-02-09

## 2023-02-09 DIAGNOSIS — Z90.710 ACQUIRED ABSENCE OF BOTH CERVIX AND UTERUS: Chronic | ICD-10-CM

## 2023-02-09 DIAGNOSIS — Z98.890 OTHER SPECIFIED POSTPROCEDURAL STATES: Chronic | ICD-10-CM

## 2023-02-13 ENCOUNTER — APPOINTMENT (OUTPATIENT)
Dept: PEDIATRIC CARDIOLOGY | Facility: CLINIC | Age: 38
End: 2023-02-13
Payer: COMMERCIAL

## 2023-02-13 PROCEDURE — 93325 DOPPLER ECHO COLOR FLOW MAPG: CPT | Mod: 59

## 2023-02-13 PROCEDURE — 76827 ECHO EXAM OF FETAL HEART: CPT

## 2023-02-13 PROCEDURE — 93325 DOPPLER ECHO COLOR FLOW MAPG: CPT

## 2023-02-13 PROCEDURE — 76825 ECHO EXAM OF FETAL HEART: CPT | Mod: 59

## 2023-02-13 PROCEDURE — 76827 ECHO EXAM OF FETAL HEART: CPT | Mod: 59

## 2023-02-15 ENCOUNTER — ASOB RESULT (OUTPATIENT)
Age: 38
End: 2023-02-15

## 2023-02-15 ENCOUNTER — APPOINTMENT (OUTPATIENT)
Dept: ANTEPARTUM | Facility: CLINIC | Age: 38
End: 2023-02-15
Payer: COMMERCIAL

## 2023-02-15 PROCEDURE — 76816 OB US FOLLOW-UP PER FETUS: CPT | Mod: 59

## 2023-02-22 ENCOUNTER — APPOINTMENT (OUTPATIENT)
Dept: OBGYN | Facility: CLINIC | Age: 38
End: 2023-02-22
Payer: COMMERCIAL

## 2023-02-22 ENCOUNTER — NON-APPOINTMENT (OUTPATIENT)
Age: 38
End: 2023-02-22

## 2023-02-22 DIAGNOSIS — O30.039 TWIN PREGNANCY, MONOCHORIONIC/DIAMNIOTIC, UNSPECIFIED TRIMESTER: ICD-10-CM

## 2023-02-22 PROCEDURE — 0502F SUBSEQUENT PRENATAL CARE: CPT

## 2023-02-28 PROBLEM — O30.039 MONOCHORIONIC DIAMNIOTIC TWIN PREGNANCY, ANTEPARTUM: Status: ACTIVE | Noted: 2022-11-22

## 2023-03-02 ENCOUNTER — ASOB RESULT (OUTPATIENT)
Age: 38
End: 2023-03-02

## 2023-03-02 ENCOUNTER — APPOINTMENT (OUTPATIENT)
Dept: ANTEPARTUM | Facility: CLINIC | Age: 38
End: 2023-03-02
Payer: COMMERCIAL

## 2023-03-02 PROCEDURE — 76816 OB US FOLLOW-UP PER FETUS: CPT | Mod: 59

## 2023-03-15 ENCOUNTER — ASOB RESULT (OUTPATIENT)
Age: 38
End: 2023-03-15

## 2023-03-15 ENCOUNTER — APPOINTMENT (OUTPATIENT)
Dept: ANTEPARTUM | Facility: CLINIC | Age: 38
End: 2023-03-15
Payer: COMMERCIAL

## 2023-03-15 PROCEDURE — 76819 FETAL BIOPHYS PROFIL W/O NST: CPT | Mod: 59

## 2023-03-29 ENCOUNTER — APPOINTMENT (OUTPATIENT)
Dept: OBGYN | Facility: CLINIC | Age: 38
End: 2023-03-29
Payer: COMMERCIAL

## 2023-03-29 ENCOUNTER — NON-APPOINTMENT (OUTPATIENT)
Age: 38
End: 2023-03-29

## 2023-03-29 DIAGNOSIS — O09.812 SUPERVISION OF PREGNANCY RESULTING FROM ASSISTED REPRODUCTIVE TECHNOLOGY, SECOND TRIMESTER: ICD-10-CM

## 2023-03-29 DIAGNOSIS — Z13.1 ENCOUNTER FOR SCREENING FOR DIABETES MELLITUS: ICD-10-CM

## 2023-03-29 DIAGNOSIS — Z11.4 ENCOUNTER FOR SCREENING FOR HUMAN IMMUNODEFICIENCY VIRUS [HIV]: ICD-10-CM

## 2023-03-29 DIAGNOSIS — Z01.84 ENCOUNTER FOR ANTIBODY RESPONSE EXAMINATION: ICD-10-CM

## 2023-03-29 DIAGNOSIS — D64.9 ANEMIA, UNSPECIFIED: ICD-10-CM

## 2023-03-29 DIAGNOSIS — O09.512 SUPERVISION OF ELDERLY PRIMIGRAVIDA, SECOND TRIMESTER: ICD-10-CM

## 2023-03-29 PROCEDURE — 0502F SUBSEQUENT PRENATAL CARE: CPT

## 2023-03-30 ENCOUNTER — APPOINTMENT (OUTPATIENT)
Dept: ANTEPARTUM | Facility: CLINIC | Age: 38
End: 2023-03-30
Payer: COMMERCIAL

## 2023-03-30 ENCOUNTER — ASOB RESULT (OUTPATIENT)
Age: 38
End: 2023-03-30

## 2023-03-30 PROCEDURE — 76816 OB US FOLLOW-UP PER FETUS: CPT | Mod: 59

## 2023-04-03 PROBLEM — O09.512 ADVANCED MATERNAL AGE, PRIMIGRAVIDA, ANTEPARTUM, SECOND TRIMESTER: Status: ACTIVE | Noted: 2023-04-03

## 2023-04-03 PROBLEM — O09.812 ENCOUNTER FOR SUPERVISION OF PREGNANCY RESULTING FROM ASSISTED REPRODUCTIVE TECHNOLOGY IN SECOND TRIMESTER: Status: ACTIVE | Noted: 2023-04-03

## 2023-04-13 ENCOUNTER — APPOINTMENT (OUTPATIENT)
Dept: ANTEPARTUM | Facility: CLINIC | Age: 38
End: 2023-04-13
Payer: COMMERCIAL

## 2023-04-13 ENCOUNTER — ASOB RESULT (OUTPATIENT)
Age: 38
End: 2023-04-13

## 2023-04-13 PROCEDURE — 76819 FETAL BIOPHYS PROFIL W/O NST: CPT | Mod: 59

## 2023-04-25 ENCOUNTER — NON-APPOINTMENT (OUTPATIENT)
Age: 38
End: 2023-04-25

## 2023-04-26 ENCOUNTER — APPOINTMENT (OUTPATIENT)
Dept: OBGYN | Facility: CLINIC | Age: 38
End: 2023-04-26
Payer: COMMERCIAL

## 2023-04-26 PROCEDURE — 90715 TDAP VACCINE 7 YRS/> IM: CPT

## 2023-04-26 PROCEDURE — 0502F SUBSEQUENT PRENATAL CARE: CPT

## 2023-04-26 PROCEDURE — 90471 IMMUNIZATION ADMIN: CPT

## 2023-04-27 ENCOUNTER — APPOINTMENT (OUTPATIENT)
Dept: ANTEPARTUM | Facility: CLINIC | Age: 38
End: 2023-04-27
Payer: COMMERCIAL

## 2023-04-27 ENCOUNTER — ASOB RESULT (OUTPATIENT)
Age: 38
End: 2023-04-27

## 2023-04-27 PROCEDURE — 76816 OB US FOLLOW-UP PER FETUS: CPT

## 2023-04-27 PROCEDURE — 76819 FETAL BIOPHYS PROFIL W/O NST: CPT | Mod: 59

## 2023-05-04 ENCOUNTER — APPOINTMENT (OUTPATIENT)
Dept: ANTEPARTUM | Facility: CLINIC | Age: 38
End: 2023-05-04
Payer: COMMERCIAL

## 2023-05-04 ENCOUNTER — ASOB RESULT (OUTPATIENT)
Age: 38
End: 2023-05-04

## 2023-05-04 LAB
25(OH)D3 SERPL-MCNC: 42.3 NG/ML
BASOPHILS # BLD AUTO: 0.02 K/UL
BASOPHILS NFR BLD AUTO: 0.2 %
BLD GP AB SCN SERPL QL: NORMAL
EOSINOPHIL # BLD AUTO: 0.07 K/UL
EOSINOPHIL NFR BLD AUTO: 0.8 %
GLUCOSE 1H P 50 G GLC PO SERPL-MCNC: 121 MG/DL
HCT VFR BLD CALC: 30.5 %
HGB BLD-MCNC: 9.6 G/DL
HIV1+2 AB SPEC QL IA.RAPID: NONREACTIVE
IMM GRANULOCYTES NFR BLD AUTO: 0.9 %
LYMPHOCYTES # BLD AUTO: 1.42 K/UL
LYMPHOCYTES NFR BLD AUTO: 15.7 %
MAN DIFF?: NORMAL
MCHC RBC-ENTMCNC: 28.6 PG
MCHC RBC-ENTMCNC: 31.5 GM/DL
MCV RBC AUTO: 90.8 FL
MONOCYTES # BLD AUTO: 0.77 K/UL
MONOCYTES NFR BLD AUTO: 8.5 %
NEUTROPHILS # BLD AUTO: 6.67 K/UL
NEUTROPHILS NFR BLD AUTO: 73.9 %
PLATELET # BLD AUTO: 151 K/UL
RBC # BLD: 3.36 M/UL
RBC # FLD: 13.8 %
T PALLIDUM AB SER QL IA: NEGATIVE
WBC # FLD AUTO: 9.03 K/UL

## 2023-05-04 PROCEDURE — 76821 MIDDLE CEREBRAL ARTERY ECHO: CPT

## 2023-05-04 PROCEDURE — 76818 FETAL BIOPHYS PROFILE W/NST: CPT

## 2023-05-07 ENCOUNTER — OUTPATIENT (OUTPATIENT)
Dept: OUTPATIENT SERVICES | Facility: HOSPITAL | Age: 38
LOS: 1 days | Discharge: HOME | End: 2023-05-07

## 2023-05-07 DIAGNOSIS — Z98.890 OTHER SPECIFIED POSTPROCEDURAL STATES: Chronic | ICD-10-CM

## 2023-05-07 DIAGNOSIS — Z90.710 ACQUIRED ABSENCE OF BOTH CERVIX AND UTERUS: Chronic | ICD-10-CM

## 2023-05-07 DIAGNOSIS — D64.9 ANEMIA, UNSPECIFIED: ICD-10-CM

## 2023-05-10 ENCOUNTER — NON-APPOINTMENT (OUTPATIENT)
Age: 38
End: 2023-05-10

## 2023-05-10 ENCOUNTER — APPOINTMENT (OUTPATIENT)
Dept: OBGYN | Facility: CLINIC | Age: 38
End: 2023-05-10
Payer: COMMERCIAL

## 2023-05-10 DIAGNOSIS — Z34.03 ENCOUNTER FOR SUPERVISION OF NORMAL FIRST PREGNANCY, THIRD TRIMESTER: ICD-10-CM

## 2023-05-10 PROCEDURE — 0502F SUBSEQUENT PRENATAL CARE: CPT

## 2023-05-11 ENCOUNTER — APPOINTMENT (OUTPATIENT)
Dept: ANTEPARTUM | Facility: CLINIC | Age: 38
End: 2023-05-11
Payer: COMMERCIAL

## 2023-05-11 ENCOUNTER — ASOB RESULT (OUTPATIENT)
Age: 38
End: 2023-05-11

## 2023-05-11 PROCEDURE — 76818 FETAL BIOPHYS PROFILE W/NST: CPT

## 2023-05-16 ENCOUNTER — TRANSCRIPTION ENCOUNTER (OUTPATIENT)
Age: 38
End: 2023-05-16

## 2023-05-17 ENCOUNTER — NON-APPOINTMENT (OUTPATIENT)
Age: 38
End: 2023-05-17

## 2023-05-17 ENCOUNTER — INPATIENT (INPATIENT)
Facility: HOSPITAL | Age: 38
LOS: 3 days | Discharge: HOME | End: 2023-05-21
Attending: STUDENT IN AN ORGANIZED HEALTH CARE EDUCATION/TRAINING PROGRAM | Admitting: STUDENT IN AN ORGANIZED HEALTH CARE EDUCATION/TRAINING PROGRAM
Payer: COMMERCIAL

## 2023-05-17 ENCOUNTER — APPOINTMENT (OUTPATIENT)
Dept: OBGYN | Facility: CLINIC | Age: 38
End: 2023-05-17
Payer: COMMERCIAL

## 2023-05-17 ENCOUNTER — APPOINTMENT (OUTPATIENT)
Dept: HEMATOLOGY ONCOLOGY | Facility: CLINIC | Age: 38
End: 2023-05-17

## 2023-05-17 VITALS — SYSTOLIC BLOOD PRESSURE: 135 MMHG | DIASTOLIC BLOOD PRESSURE: 84 MMHG | HEART RATE: 84 BPM

## 2023-05-17 DIAGNOSIS — Z90.710 ACQUIRED ABSENCE OF BOTH CERVIX AND UTERUS: Chronic | ICD-10-CM

## 2023-05-17 DIAGNOSIS — O26.899 OTHER SPECIFIED PREGNANCY RELATED CONDITIONS, UNSPECIFIED TRIMESTER: ICD-10-CM

## 2023-05-17 DIAGNOSIS — Z98.890 OTHER SPECIFIED POSTPROCEDURAL STATES: Chronic | ICD-10-CM

## 2023-05-17 DIAGNOSIS — Z34.80 ENCOUNTER FOR SUPERVISION OF OTHER NORMAL PREGNANCY, UNSPECIFIED TRIMESTER: ICD-10-CM

## 2023-05-17 LAB
ANISOCYTOSIS BLD QL: SLIGHT — SIGNIFICANT CHANGE UP
BASOPHILS # BLD AUTO: 0 K/UL — SIGNIFICANT CHANGE UP (ref 0–0.2)
BASOPHILS NFR BLD AUTO: 0 % — SIGNIFICANT CHANGE UP (ref 0–2)
BLD GP AB SCN SERPL QL: NEGATIVE — SIGNIFICANT CHANGE UP
COVID-19 SPIKE DOMAIN AB INTERP: POSITIVE
COVID-19 SPIKE DOMAIN ANTIBODY RESULT: >250 U/ML — HIGH
DACRYOCYTES BLD QL SMEAR: SLIGHT — SIGNIFICANT CHANGE UP
EOSINOPHIL # BLD AUTO: 0.18 K/UL — SIGNIFICANT CHANGE UP (ref 0–0.5)
EOSINOPHIL NFR BLD AUTO: 2.5 % — SIGNIFICANT CHANGE UP (ref 0–6)
GIANT PLATELETS BLD QL SMEAR: PRESENT — SIGNIFICANT CHANGE UP
HCT VFR BLD CALC: 36.8 % — SIGNIFICANT CHANGE UP (ref 34.5–45)
HGB BLD-MCNC: 12 G/DL — SIGNIFICANT CHANGE UP (ref 11.5–15.5)
LYMPHOCYTES # BLD AUTO: 0.99 K/UL — LOW (ref 1–3.3)
LYMPHOCYTES # BLD AUTO: 13.7 % — SIGNIFICANT CHANGE UP (ref 13–44)
MACROCYTES BLD QL: SLIGHT — SIGNIFICANT CHANGE UP
MANUAL SMEAR VERIFICATION: SIGNIFICANT CHANGE UP
MCHC RBC-ENTMCNC: 28.2 PG — SIGNIFICANT CHANGE UP (ref 27–34)
MCHC RBC-ENTMCNC: 32.6 GM/DL — SIGNIFICANT CHANGE UP (ref 32–36)
MCV RBC AUTO: 86.6 FL — SIGNIFICANT CHANGE UP (ref 80–100)
MONOCYTES # BLD AUTO: 0.55 K/UL — SIGNIFICANT CHANGE UP (ref 0–0.9)
MONOCYTES NFR BLD AUTO: 7.7 % — SIGNIFICANT CHANGE UP (ref 2–14)
NEUTROPHILS # BLD AUTO: 5.47 K/UL — SIGNIFICANT CHANGE UP (ref 1.8–7.4)
NEUTROPHILS NFR BLD AUTO: 76.1 % — SIGNIFICANT CHANGE UP (ref 43–77)
OVALOCYTES BLD QL SMEAR: SLIGHT — SIGNIFICANT CHANGE UP
PLAT MORPH BLD: ABNORMAL
PLATELET # BLD AUTO: 123 K/UL — LOW (ref 150–400)
POIKILOCYTOSIS BLD QL AUTO: SLIGHT — SIGNIFICANT CHANGE UP
POLYCHROMASIA BLD QL SMEAR: SLIGHT — SIGNIFICANT CHANGE UP
RBC # BLD: 4.25 M/UL — SIGNIFICANT CHANGE UP (ref 3.8–5.2)
RBC # FLD: 23.8 % — HIGH (ref 10.3–14.5)
RBC BLD AUTO: ABNORMAL
RH IG SCN BLD-IMP: POSITIVE — SIGNIFICANT CHANGE UP
SARS-COV-2 IGG+IGM SERPL QL IA: >250 U/ML — HIGH
SARS-COV-2 IGG+IGM SERPL QL IA: POSITIVE
SMUDGE CELLS # BLD: PRESENT — SIGNIFICANT CHANGE UP
T PALLIDUM AB TITR SER: NEGATIVE — SIGNIFICANT CHANGE UP
WBC # BLD: 7.19 K/UL — SIGNIFICANT CHANGE UP (ref 3.8–10.5)
WBC # FLD AUTO: 7.19 K/UL — SIGNIFICANT CHANGE UP (ref 3.8–10.5)

## 2023-05-17 PROCEDURE — 88307 TISSUE EXAM BY PATHOLOGIST: CPT | Mod: 26

## 2023-05-17 PROCEDURE — 0502F SUBSEQUENT PRENATAL CARE: CPT

## 2023-05-17 PROCEDURE — 59510 CESAREAN DELIVERY: CPT | Mod: 22

## 2023-05-17 RX ORDER — SODIUM CHLORIDE 9 MG/ML
1000 INJECTION, SOLUTION INTRAVENOUS
Refills: 0 | Status: DISCONTINUED | OUTPATIENT
Start: 2023-05-17 | End: 2023-05-17

## 2023-05-17 RX ORDER — ACETAMINOPHEN 500 MG
975 TABLET ORAL
Refills: 0 | Status: DISCONTINUED | OUTPATIENT
Start: 2023-05-17 | End: 2023-05-21

## 2023-05-17 RX ORDER — HEPARIN SODIUM 5000 [USP'U]/ML
5000 INJECTION INTRAVENOUS; SUBCUTANEOUS EVERY 12 HOURS
Refills: 0 | Status: DISCONTINUED | OUTPATIENT
Start: 2023-05-17 | End: 2023-05-21

## 2023-05-17 RX ORDER — OXYCODONE HYDROCHLORIDE 5 MG/1
5 TABLET ORAL
Refills: 0 | Status: COMPLETED | OUTPATIENT
Start: 2023-05-17 | End: 2023-05-24

## 2023-05-17 RX ORDER — KETOROLAC TROMETHAMINE 30 MG/ML
30 SYRINGE (ML) INJECTION EVERY 6 HOURS
Refills: 0 | Status: DISCONTINUED | OUTPATIENT
Start: 2023-05-17 | End: 2023-05-19

## 2023-05-17 RX ORDER — ACETAMINOPHEN 500 MG
1000 TABLET ORAL ONCE
Refills: 0 | Status: COMPLETED | OUTPATIENT
Start: 2023-05-17 | End: 2023-05-17

## 2023-05-17 RX ORDER — AMPICILLIN TRIHYDRATE 250 MG
2 CAPSULE ORAL ONCE
Refills: 0 | Status: COMPLETED | OUTPATIENT
Start: 2023-05-17 | End: 2023-05-17

## 2023-05-17 RX ORDER — FAMOTIDINE 10 MG/ML
20 INJECTION INTRAVENOUS ONCE
Refills: 0 | Status: COMPLETED | OUTPATIENT
Start: 2023-05-17 | End: 2023-05-17

## 2023-05-17 RX ORDER — LEVOTHYROXINE SODIUM 125 MCG
25 TABLET ORAL DAILY
Refills: 0 | Status: DISCONTINUED | OUTPATIENT
Start: 2023-05-17 | End: 2023-05-21

## 2023-05-17 RX ORDER — NALOXONE HYDROCHLORIDE 4 MG/.1ML
0.1 SPRAY NASAL
Refills: 0 | Status: DISCONTINUED | OUTPATIENT
Start: 2023-05-17 | End: 2023-05-18

## 2023-05-17 RX ORDER — SODIUM CHLORIDE 9 MG/ML
1000 INJECTION, SOLUTION INTRAVENOUS ONCE
Refills: 0 | Status: COMPLETED | OUTPATIENT
Start: 2023-05-17 | End: 2023-05-17

## 2023-05-17 RX ORDER — IBUPROFEN 200 MG
600 TABLET ORAL EVERY 6 HOURS
Refills: 0 | Status: COMPLETED | OUTPATIENT
Start: 2023-05-17 | End: 2024-04-14

## 2023-05-17 RX ORDER — OXYTOCIN 10 UNIT/ML
333.33 VIAL (ML) INJECTION
Qty: 20 | Refills: 0 | Status: DISCONTINUED | OUTPATIENT
Start: 2023-05-17 | End: 2023-05-21

## 2023-05-17 RX ORDER — NALBUPHINE HYDROCHLORIDE 10 MG/ML
2.5 INJECTION, SOLUTION INTRAMUSCULAR; INTRAVENOUS; SUBCUTANEOUS EVERY 6 HOURS
Refills: 0 | Status: COMPLETED | OUTPATIENT
Start: 2023-05-17 | End: 2023-05-18

## 2023-05-17 RX ORDER — DEXAMETHASONE 0.5 MG/5ML
4 ELIXIR ORAL EVERY 6 HOURS
Refills: 0 | Status: DISCONTINUED | OUTPATIENT
Start: 2023-05-17 | End: 2023-05-18

## 2023-05-17 RX ORDER — OXYCODONE HYDROCHLORIDE 5 MG/1
5 TABLET ORAL
Refills: 0 | Status: DISCONTINUED | OUTPATIENT
Start: 2023-05-17 | End: 2023-05-18

## 2023-05-17 RX ORDER — ONDANSETRON 8 MG/1
4 TABLET, FILM COATED ORAL EVERY 6 HOURS
Refills: 0 | Status: DISCONTINUED | OUTPATIENT
Start: 2023-05-17 | End: 2023-05-18

## 2023-05-17 RX ORDER — OXYCODONE HYDROCHLORIDE 5 MG/1
5 TABLET ORAL ONCE
Refills: 0 | Status: DISCONTINUED | OUTPATIENT
Start: 2023-05-17 | End: 2023-05-21

## 2023-05-17 RX ORDER — MORPHINE SULFATE 50 MG/1
0.1 CAPSULE, EXTENDED RELEASE ORAL ONCE
Refills: 0 | Status: DISCONTINUED | OUTPATIENT
Start: 2023-05-17 | End: 2023-05-18

## 2023-05-17 RX ORDER — OXYCODONE HYDROCHLORIDE 5 MG/1
10 TABLET ORAL
Refills: 0 | Status: DISCONTINUED | OUTPATIENT
Start: 2023-05-17 | End: 2023-05-18

## 2023-05-17 RX ORDER — AMPICILLIN TRIHYDRATE 250 MG
1 CAPSULE ORAL EVERY 4 HOURS
Refills: 0 | Status: DISCONTINUED | OUTPATIENT
Start: 2023-05-17 | End: 2023-05-17

## 2023-05-17 RX ORDER — CITRIC ACID/SODIUM CITRATE 300-500 MG
30 SOLUTION, ORAL ORAL ONCE
Refills: 0 | Status: COMPLETED | OUTPATIENT
Start: 2023-05-17 | End: 2023-05-17

## 2023-05-17 RX ADMIN — HEPARIN SODIUM 5000 UNIT(S): 5000 INJECTION INTRAVENOUS; SUBCUTANEOUS at 21:42

## 2023-05-17 RX ADMIN — Medication 400 MILLIGRAM(S): at 16:02

## 2023-05-17 RX ADMIN — FAMOTIDINE 20 MILLIGRAM(S): 10 INJECTION INTRAVENOUS at 12:00

## 2023-05-17 RX ADMIN — NALBUPHINE HYDROCHLORIDE 2.5 MILLIGRAM(S): 10 INJECTION, SOLUTION INTRAMUSCULAR; INTRAVENOUS; SUBCUTANEOUS at 15:25

## 2023-05-17 RX ADMIN — Medication 30 MILLIGRAM(S): at 23:50

## 2023-05-17 RX ADMIN — SODIUM CHLORIDE 2000 MILLILITER(S): 9 INJECTION, SOLUTION INTRAVENOUS at 11:40

## 2023-05-17 RX ADMIN — Medication 975 MILLIGRAM(S): at 21:41

## 2023-05-17 RX ADMIN — Medication 975 MILLIGRAM(S): at 22:20

## 2023-05-17 RX ADMIN — SODIUM CHLORIDE 2000 MILLILITER(S): 9 INJECTION, SOLUTION INTRAVENOUS at 23:43

## 2023-05-17 RX ADMIN — Medication 30 MILLILITER(S): at 12:00

## 2023-05-17 RX ADMIN — Medication 200 GRAM(S): at 11:53

## 2023-05-17 NOTE — OB NEONATOLOGY/PEDIATRICIAN DELIVERY SUMMARY - NSPEDSNEONOTESA_OBGYN_ALL_OB_FT
Requested to attend CS delivery for mono-di twins due to twin premature and breech presentation.  Mother is a  37yo  at 33.6 weeks of gestation. Prenatal labs O+, negative/NR/immune. GBS unknown.   Maternal PMHx: remarkable for myomectomy, hypothyroidism during pregnancy on synthroid 50mcg, biateral ovarian cystectomy, thyroid nodules, rathkes cleft cyst, polyhydramnios. Prenatal Care uncomplicated. ROM at 9am ( 5 hours prior to delivery), clear fluid. Delivery by primary CS, breech presentation. Emerged vigorous. Delayed cord clamping for 30 seconds. Warmed, dried, stimulated and suctioned. APGAR 9/9. Mother wants breast feed, desires HepB vaccine and circumcision. Requested to attend CS delivery for mono-di twins due to twin premature and breech presentation.  Mother is a  39yo  at 33.6 weeks of gestation. Prenatal labs O+, negative/NR/immune. GBS unknown.   Maternal PMHx: remarkable for myomectomy, hypothyroidism pre-pregnancy on synthroid 50mcg, bilateral ovarian cystectomy, thyroid nodules, rathkes cleft cyst, polyhydramnios. Prenatal Care uncomplicated. ROM at 9am ( 5 hours prior to delivery), clear fluid. Delivery by primary CS, breech presentation. Emerged vigorous. Delayed cord clamping for 30 seconds. Warmed, dried, stimulated and suctioned. APGAR 9/9. Mother wants breast feed, desires HepB vaccine and circumcision.

## 2023-05-17 NOTE — OB PROVIDER H&P - NSHPPHYSICALEXAM_GEN_ALL_CORE
ICU Vital Signs Last 24 Hrs  T(C): --  T(F): --  HR: 83 (17 May 2023 11:28) (78 - 108)  BP: 137/84 (17 May 2023 11:15) (130/79 - 137/84)  BP(mean): --  ABP: --  ABP(mean): --  RR: --  SpO2: 100% (17 May 2023 11:28) (99% - 100%)    gen: NAD  Heart: S1S2 RRR  Lungs CTA b/l  Abd: gravid NTND  LE: no calf tenderness    VE: deferred - grossly ruptured per office    Sono: +FH visualized x 2  Twin A transverse  Twin B transverse

## 2023-05-17 NOTE — OB PROVIDER H&P - ASSESSMENT
A/P: 37 yo G1 @ 33w6d with mono-di gestation and h/o multiple uterine surgeries presents with PPROM  - admit to L&D  - routine labs  - IV hydration  - NPO   - anesthesia consult  - hypothyroidism - synthroid 25mcg daily  - 2u PRBC on hold  - PPROM - ampicillin ordered    d/w Dr Thomas

## 2023-05-17 NOTE — OB NEONATOLOGY/PEDIATRICIAN DELIVERY SUMMARY - NSPEDSNEONOTESB_OBGYN_ALL_OB_FT
Requested by OB to attend delivery of 33 6/7 mono-di male twins born via primary c/s for PROM of Twin A. Mother is 29yo , prenatal labs neg/NR/Imm, GBS unknown. Maternal h/o hypothyroidism on synthroid. This is an IVF pregnancy which was complicated by twin pregnancy, polyhydramnios, and premature ROM of Twin A. Twin B SROM clear at delivery, delivered levon breech with terminal meconium and emerged with spontaneous cry. Delayed cord clamping x 30 seconds. Strong cry but remained dusky. CPAP 5/30% initiated at ~2 minutes of life with improvement in color to within target range. FiO2 weaned to 21% prior to transfer. Strong cry, pink, active. Nasal flaring. Parents updated in Belarusian. Apgars 8/9. Mother wishes to breastfeed, consents to circ and Hep B vaccine. Requested by OB to attend delivery of 33 6/7 mono-di male twins born via primary c/s for PROM of Twin A. Mother is 39yo , prenatal labs neg/NR/Imm, GBS unknown. Maternal h/o hypothyroidism on synthroid. This is an IVF pregnancy which was complicated by twin pregnancy, polyhydramnios, and premature ROM of Twin A. Twin B SROM clear at delivery, delivered levon breech with terminal meconium and emerged with spontaneous cry. Delayed cord clamping x 30 seconds. Strong cry but remained dusky. CPAP 5/30% initiated at ~2 minutes of life with improvement in color to within target range. FiO2 weaned to 21% prior to transfer. Strong cry, pink, active. Nasal flaring. Parents updated in Chinese. Apgars 8/9. Mother wishes to breastfeed, consents to circ and Hep B vaccine.

## 2023-05-17 NOTE — OB RN DELIVERY SUMMARY - BABY B: APGAR 5 MIN MUSCLE TONE, DELIVERY
Chief Complaint Medicare wellness, hypertension, hyperlipidemia, gastroesophageal reflux disease     History of present illness    Patient presents with   • Medicare Wellness Visit     annual   • Hypertension     treated with Doxazosin 4 mg nightly, Losartan 100 mg one daily, and Metoprolol  mg one daily. BMP done 6/21/16 was normal   • Hyperlipidemia     treated with Pravastatin 80 mg one daily and patient tolerating medication without side effects. Lipid panel done 6/21/16 showed cholesterol 185, , HDL 35 and trig 193   • Gastroesophageal Reflux     treated with Omeprazole 20 mg one daily   • Other     health maintenance updated      Past Medical History:   Diagnosis Date   • Colon cancer screening 5/5/2016    Colonoscopy done 05/05/2016. Normal study. Follow-up in 10 years.   • Diverticulosis of colon 5/5/2016   • GERD (gastroesophageal reflux disease)    • HTN (hypertension)    • Hyperlipidemia    • Migraines    • Prostate cancer (CMS/HCC)        I have reviewed the patient's medications and allergies, past medical, surgical, social and family history, updating these as appropriate.  See Histories section of the EMR for a display of this information.    ROS:  All other ROS negative except as documented in the HPI    Visit Vitals  /78   Pulse 58   Ht 6' 2\" (1.88 m)   Wt 101.2 kg   BMI 28.63 kg/m²       Physical Exam:  The patient is alert oriented carries on normal conversation gives a good history. Extraocular movements are intact. Lungs remain clear, there are no rhonchi rales or wheeze. Respiratory effort is normal. Heart has a regular rhythm normal S1 and S2 heart rate is controlled.      Assessment and Plan:  1.  Hypertension continue doxazosin losartan  2.  Hyperlipidemia continue the pravastatin  3.  Gastroesophageal reflux disease continue omeprazole  4.  Medicare Wellness Exam.  Personalized Prevention Plan, Provider Care Team, and HRA questionnaire completed and reviewed.     (2) well flexed

## 2023-05-17 NOTE — OB RN INTRAOPERATIVE NOTE - NS_PROVIDERPREP_OBGYN_ALL_OB
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    Easy bruisability stable    Urinary retention self-caths (Ochoa)    Cervical disc displacement    DDD (degenerative disc disease), cervical    Urge incontinence of urine    Vertigo    Knee pain, bilateral    Elevated PTH level    Malignant neoplasm of left breast infiltrating ductal (Smith)    Gastroesophageal reflux disease with esophagitis    Recurrent UTI    CVA (cerebrovascular accident) subacute    Dyspnea on exertion    Joint pain    Conjunctivitis    Cervical pain (Servando=PM)    Obesity (BMI 30.0-34.9)    Hardening of the arteries of the brain    Allergic contact dermatitis    Anterior cervical adenopathy due to infection    Arthralgia        Procedures Performed       Procedures  Imaging Results (all)     Procedure Component Value Units Date/Time    XR Knee 1 or 2 View Bilateral [[id number]] Collected:  04/06/17 1541     Updated:  04/06/17 1556    Narrative:       RIGHT AND LEFT KNEE X-RAYS     HISTORY: Knee pain and bruising.     2 images of each knee are provided. These are correlated with right knee  postoperative x-ray 04/09/2012.     FINDINGS: There is right total knee arthroplasty hardware. No joint  effusion is present. The bones appear normal. No hardware loosening is  evident.     2 images of the left knee are provided. There is no joint effusion.  Total knee arthroplasty hardware is positioned as  expected with no  evidence of hardware loosening. The subcutaneous fat over the anterior  aspect of the left knee appears thicker than that on the right, and  there is some subcutaneous edema anteriorly on the left. No soft tissue  gas is present.       Impression:       There is bilateral total knee arthroplasty hardware. No  joint effusion or other evidence of hardware failure is present. The  subcutaneous fat over the anterior aspect of the left knee is thicker  than that on the right, of uncertain etiology.     This report was finalized on 2017 3:53 PM by Dr. Rocael Anne MD.       US Pelvis Limited [[id number]] Collected:  17     Updated:  17    Narrative:       LIMITED NON-OB TRANSABDOMINAL TRANSVAGINAL AND LIMITED VASCULAR  ULTRASOUND     HISTORY: 79-year-old female with evidence of swelling in the pelvic  region  6 para 6 last menstrual cycle age 34 (hysterectomy)  evaluate ovaries.     COMPARISON: None available     FINDINGS:  1. Uterus is surgically absent.  2. Ovaries are not imaged despite transvaginal technique.  3. No adnexal mass nor free fluid.     This report was finalized on 2017 7:44 PM by Dr. Christofer Chandra MD.       US Non-ob Transvaginal [[id number]] Collected:  17     Updated:  17    Narrative:       LIMITED NON-OB TRANSABDOMINAL TRANSVAGINAL AND LIMITED VASCULAR  ULTRASOUND     HISTORY: 79-year-old female with evidence of swelling in the pelvic  region  6 para 6 last menstrual cycle age 34 (hysterectomy)  evaluate ovaries.     COMPARISON: None available     FINDINGS:  1. Uterus is surgically absent.  2. Ovaries are not imaged despite transvaginal technique.  3. No adnexal mass nor free fluid.     This report was finalized on 2017 7:44 PM by Dr. Christofer Chandra MD.       MRI Brain With & Without Contrast [[id number]] Collected:  17     Updated:  17 1510    Narrative:       MRI BRAIN WITH AND WITHOUT CONTRAST      HISTORY: Ataxia, headaches.     COMPARISON: MRI brain 12/05/2015.     TECHNIQUE: A MRI examination of the brain was performed utilizing axial  diffusion, T1, T2, FLAIR, gradient-echo T2, sagittal T1 as well as  axial, coronal T1 postcontrast weighted sequences.     FINDINGS: There is no evidence of restricted diffusion to suggest acute  infarction. There is mild-to-moderate small vessel ischemic disease,  similar in appearance when compared to the prior MRI examination of  12/05/2016. After contrast administration there was no evidence of  abnormal enhancement.     A remote infarct involving the left occipital lobe posteriorly,  inferiorly and medially is noted, present previously.       Impression:       Small vessel ischemic disease with no evidence of acute  infarction, mass or of abnormal enhancement.     This report was finalized on 4/9/2017 3:07 PM by Dr. Scott Leon MD.       NM Bone Scan Whole Body [[id number]] Collected:  04/09/17 1716     Updated:  04/10/17 0713    Narrative:       EXAMINATION: NUCLEAR MEDICINE WHOLE-BODY BONE SCAN     HISTORY: 79-year-old female with a history of breast cancer and joint  and bone pain.     FINDINGS: 23 mCi of technetium MDP was administered intravenously and  whole body anterior and posterior sonographic images were obtained in  conjunction with coned-down sonographic images of the bilateral ankles  and feet, thoracic spine, skull and arms.     The images demonstrate abnormal focal increased uptake within the left  ankle, left sternoclavicular joint and left shoulder.       Impression:       Increased uptake in the region of the left sternoclavicular  joint, left ankle and left shoulder. This is likely degenerative in  nature. However, I recommend that plane film correlation of the left  sternoclavicular joint, left ankle and left shoulder be performed.     This report was finalized on 4/10/2017 7:10 AM by Dr. Nicolas Keith MD.       XR Clavicle Bilateral [[id number]]  Collected:  04/10/17 1516     Updated:  04/10/17 1523    Narrative:       XR CLAVICLE BILATERAL-, XR SHOULDER 2+ VW LEFT-     INDICATIONS: Breast cancer, abnormal bone scan     TECHNIQUE: 2 VIEWS OF EACH CLAVICLE, 2 VIEWS OF THE LEFT SHOULDER     COMPARISON: Right shoulder x-ray from 06/04/2012, chest x-ray from  01/03/2017     FINDINGS:      Moderate hypertrophic degenerative changes are seen in the bilateral  acromioclavicular joints, and degenerative spurring is present in both  humeral heads. No acute fracture or destructive osseous lesion is  identified.       Impression:          Degenerative changes. No acute fracture or destructive osseous lesion is  identified in either clavicle or in the left shoulder. Follow up/further  evaluation can be obtained is indications persist     This report was finalized on 4/10/2017 3:20 PM by Dr. Umesh Harrison MD.       XR Shoulder 2+ View Left [[id number]] Collected:  04/10/17 1516     Updated:  04/10/17 1523    Narrative:       XR CLAVICLE BILATERAL-, XR SHOULDER 2+ VW LEFT-     INDICATIONS: Breast cancer, abnormal bone scan     TECHNIQUE: 2 VIEWS OF EACH CLAVICLE, 2 VIEWS OF THE LEFT SHOULDER     COMPARISON: Right shoulder x-ray from 06/04/2012, chest x-ray from  01/03/2017     FINDINGS:      Moderate hypertrophic degenerative changes are seen in the bilateral  acromioclavicular joints, and degenerative spurring is present in both  humeral heads. No acute fracture or destructive osseous lesion is  identified.       Impression:          Degenerative changes. No acute fracture or destructive osseous lesion is  identified in either clavicle or in the left shoulder. Follow up/further  evaluation can be obtained is indications persist     This report was finalized on 4/10/2017 3:20 PM by Dr. Umesh Harrison MD.       XR Ankle 3+ View Left [[id number]] Collected:  04/10/17 1521     Updated:  04/10/17 1526    Narrative:       XR ANKLE 3+ VW LEFT-     INDICATIONS: Breast  cancer, abnormal bone scan     TECHNIQUE: 3 VIEWS OF THE LEFT ANKLE     COMPARISON: Correlated with bone scan from 04/09/2017     FINDINGS:      No acute fracture or erosion is identified. The mortise appears  preserved. Degenerative spurring is seen at the dorsal aspect of the  foot, the plantar calcaneus, at the distal tibia.       Impression:          Degenerative changes. No acute fracture or destructive osseous lesion is  identified. Follow up/further evaluation can be obtained as indications  persist.     This report was finalized on 4/10/2017 3:23 PM by Dr. Umesh Harrison MD.               Treatment Team at Hospital  Treatment Team:   Attending Provider: Óscar Cadet MD  Consulting Physician: Óscar Cadet MD  Consulting Physician: Darryl Nichols MD  Consulting Physician: Rj Bond MD  Consulting Physician: Lamonte Childress MD  Consulting Physician: Charla Pretty MD  Consulting Physician: Porfirio Mccloud MD  Consulting Physician: Rodrick Espinoza II, MD  Consulting Physician: Wicho Hallman MD  Admitting Provider: Óscar Cadet MD      Presenting Problem/History of Present Illness  Chief Complaint   Patient presents with   • Leg Pain     Patient states this has been going on since starting on hydralazine, all joints hurt   • Leg Swelling                    Chief Complaint: Weakness with difficulty ambulating and arthralgias               Subjective       Interval History: Patient is a 79 y.o.female who presented with weakness and difficulty ambulating along with arthralgias to the emergency room at [facility] on 4/6/17. Patient was seen and initially evaluated by Dr. Estevan Vega. Patient did call Dr. Cadet with a long laundry list of symptoms that she felt needed emergent evaluation because of weakness and difficulty walking for the last few days. She also reported an 8-10 month history of progressive arthralgias that she felt were related to a trial of  hydralazine which was used between March and November 2016 2 attempt treatment of her labile hypertension. Patient has been in the hospital had extensive workups in the past for other signs and symptoms. She also had complaints of moderate headache, rib pain, and a 25 pound weight loss since December 2016 She denied any fever, vomiting, diarrhea, dysuria or chronic shortness of breath.       Dr. Vega's workup in the emergency room was rather unrevealing. He described her problem as a 10 month history of gradual onset, constant timing, diffuse joint pain, radiating nowhere, aching in quality, moderate in intensity, progression was worsening, associated with moderate headache rib related 25 pound weight loss, aggravated by nothing, alleviated by nothing, no previous episodes for comparison. The positive is in his review of systems included the 20 pound weight loss since January, chronic shortness of breath, chest and rib pain, the arthralgias, and the headaches. Physical exam revealed normal vitals and essentially normal exam. Patient did have some bruising on her knees and some reported swelling in her lower pelvic eye area. Dr. Vega called to discuss the case with me. Patient was somewhat labile with her blood pressure at that point and we felt that she probably didn't merit admission for observation at least on an overnight basis to gain further understanding of her condition.      4/6/17. I personally saw the patient for the first time on this date in the emergency room soon after Dr. Vega's evaluation. Patient was resting comfortably in bed but in some distress due to need for urination. Patient did provide long list of signs and symptoms as detailed above and in Dr. Vega's notes. Patient seems extremely anxious and distressed at the current time. She does have a rather ill  at home and is concerned about his welfare more than her own. Patient has major concerns also about her blood pressure  lability and feels that the medication she is currently taking are not controlling it adequately. She would like to consult with her cardiologist and renal doctors in the hospital. She also does have shortness of breath and congestion. Reports a productive cough at times.     4/7/17. Patient seen again today in her room on 4 N. She was resting comfortably in bed sleeping on her right side as I entered the room. Patient's blood pressure remains quite labile. And at times, she is demonstrating blood pressures that are systolically greater then 200. Renal has initiated new medication to try to improve the blood pressure findings. I did discuss the case with Dr. Bond do plan to see the patient later in the day. He is continuing with medication adjustments and also has suggested workup for possible sleep apnea. Cardiology has signed off the case and defer blood pressure management to nephrology. I found no evidence of cardiac etiology for her discomfort and pain. Dr. Lepe has evaluated the patient neurologically and feels that there is no strong evidence to support a neurologic etiology for the patient's symptoms. Pulmonary is seeing the patient doing an overnight oximetry study. They feel her COPD is relatively stable and recommended continuation of her home meds. OB/GYN found no major abnormalities on their exam to evaluate swelling and pain in the groin. Orthopedics did not feel that she had any significant mechanical joint issues going on. As discussed in yesterday's note, rheumatology plan outpatient follow-up with the patient.     4/8/17. Patient remains in observation status. She was seen in her bed on 4 N. earlier this evening by myself. Patient has no major new complaints or concerns. Her urine culture has come back positive for Escherichia coli which is sensitive to this Rocephin she is currently taking. Patient does want a prescription written for her Talwin NX which is unavailable at [facility] and she will  have her family  at the drugstore tomorrow. Renal continues to monitor blood pressure and adjust her medications. Other specialists are deferring to them with respect to the blood pressure issues. We anticipate that she will be able to discharge to home in the next 1-2 days. I have requested full inpatient status for the patient that at this point [facility] does not feel she meets criteria for this designation.     4/9/17. Patient is nearing the end of her workup. She has been evaluated now by neurology because of an apparent. We are awaiting the final word from neurology with regard to their findings on MRI patient remains relatively stable. Her godmother is sitting with her today said that the mother who is taking a short break. Hoping to arrange for new 's license later this week. May need to also  and revise other materials for testing done previously.     4/10/17. Patient continues to complain of severe headaches which seem to be associated with fluctuations in her blood pressure. Dr. Adler continuing to follow and adjust her blood pressure medications. It does appear that she ihas more accurate blood pressures with a manual cuff them with the automatic cuffs. We will anticipate discharge to home tomorrow with follow-up on an outpatient basis to the rheumatology clinic. Otherwise patient seems very stable today.    4/11/17.  Patient's blood pressure is stabilized today on all specialists now agreed that she is able to discharge to home for follow-up on an outpatient basis.  Oncology has seen the patient several times and after doing a bone scan feels strongly that she has no evidence whatsoever of metastatic cancer interventions.  Neurology has seen the patient and feels she has no neurologic abnormalities that need further follow-up for treatment.  Renal will follow the patient in their clinic for further input on treatment of her blood pressure changes.  Pulmonary did suggest a follow-up with  "themselves in 6-8 weeks for her stable COPD.  She has no saturations consistent with sleep apnea at this point.  At this point AND MAXIMIZE BENEFIT FROM HOSPITALIZATION AND WAS READY FOR DISCHARGE TO HOME           Vital Signs  Temp:  [97.4 °F (36.3 °C)-98.7 °F (37.1 °C)] 97.4 °F (36.3 °C)  Heart Rate:  [57-70] 70  Resp:  [18-20] 20  BP: (136-182)/() 136/101      Exam:  BP (!) 192/88 (BP Location: Right arm)  Pulse 79  Temp 98.2 °F (36.8 °C) (Oral)  Resp 20  Ht 63\" (160 cm)  Wt 182 lb 8 oz (82.8 kg)  SpO2 95%  BMI 32.33 kg/m2     Constitutional:   Alert, cooperative, no distress, AAOx3, resting comfortably  Head:   Normocephalic, without obvious abnormality, atraumatic  Eyes:   PERRLA, conjunctiva/corneas clear, no icterus, no conjunctival pallor, EOM's intact, both eyes  ENT and Mouth:  Lips, tongue, gums normal; oral mucosa pink and moist  Neck:  Supple, symmetrical, trachea midline, no JVD  Respiratory: Clear to auscultation bilaterally, respirations unlabored  Cardiovascular:   Regular rate and rhythm, S1 and S2 normal, no murmur,  no Rub or gallop. Pulses normal.   Gastrointestinal: BS present x 4 Soft, non-tender, bowel sounds active, no masses, no hepatosplenomegaly  : No hernia. Normal exam for sex.   Musculoskeletal:  Extremities normal, atraumatic, no cyanosis or edema. No arthropathy. No deformity. Gait normal  Skin:  Skin is warm and dry, no rashes, swelling or palpable lesions  Neurologic:  CNII-XII intact, motor strength grossly intact, sensation grossly intact to light touch, no focal reflexl deficits noted   Psychiatric: Alert, oriented x 3, no delusions, psychosis,depression,anxiety Heme/Lymph/Imun: No bruises, petechiae. Lymph nodes normal in size/configuration           Pertinent Test Results:      Results from last 7 days  Lab Units 04/11/17  0503 04/10/17  0400 04/09/17  0326   SODIUM mmol/L 142 142 142   POTASSIUM mmol/L 3.7 3.5 3.5   CHLORIDE mmol/L 103 103 102   TOTAL CO2 " mmol/L 25.4 25.0 23.8   BUN mg/dL 23 18 16   CREATININE mg/dL 0.91 0.80 0.79   GLUCOSE mg/dL 114* 82 96   CALCIUM mg/dL 9.6 9.6 9.3         Results from last 7 days  Lab Units 04/11/17  0503 04/10/17  0401 04/09/17  0326   WBC 10*3/mm3 6.22 6.02 6.24   HEMOGLOBIN g/dL 13.5 13.9 14.0   HEMATOCRIT % 42.5 43.5 43.3   PLATELETS 10*3/mm3 167 181 186       No results found for: LIPASE      Results from last 7 days  Lab Units 04/06/17  1130   CRP mg/dL 0.13             Condition on Discharge:  Stable    Discharge Disposition  Home or Self Care    Transport Plan  [facility] Treatments discontinued at time of Discharge  IV, Telemetry, Miller Catheter, Deep Lines and PICC LInes    Discharge Medications   Sasha Barrett   [place] Medication Instructions AWAIS:[id number]    Printed on:04/11/17 1528   Medication Information                      albuterol (PROAIR HFA) 108 (90 BASE) MCG/ACT inhaler  ProAir  (90 Base) MCG/ACT Inhalation Aerosol Solution; Patient Sig: ProAir  (90 Base) MCG/ACT Inhalation Aerosol Solution ; 8; 0; 18-Jun-2014; Active             aspirin 81 MG tablet  Take 162 mg by mouth Daily.             budesonide (PULMICORT FLEXHALER) 180 MCG/ACT inhaler  Pulmicort Flexhaler 180 MCG/ACT Inhalation Aerosol Powder Breath Activated; Patient Sig: Pulmicort Flexhaler 180 MCG/ACT Inhalation Aerosol Powder Breath Activated ; 0; 26-Jun-2014; Active             butalbital-acetaminophen-caffeine (FIORICET, ESGIC) -40 MG per tablet  Take 2 tablets by mouth Every 4 (Four) Hours As Needed for Headache.             cefdinir (OMNICEF) 300 MG capsule  Take 1 capsule by mouth 2 (Two) Times a Day.             Cholecalciferol (VITAMIN D) 1000 UNITS tablet  Take  by mouth.             CloNIDine (CATAPRES) 0.2 MG tablet  Take 1 tablet by mouth Every 4 (Four) Hours As Needed (prn for sbp >= 180).             CloNIDine (CATAPRES) 0.2 MG tablet  Take 1 tablet by mouth Every 8 (Eight) Hours.              diphenhydrAMINE (BENADRYL) 50 MG tablet  Take 1 tablet by mouth every 4 (four) hours as needed for itching or allergies.             eplerenone (INSPRA) 50 MG tablet  Take 1 tablet by mouth Every 12 (Twelve) Hours.             ethacrynic acid (EDECRIN) 25 MG tablet  Take 25 mg by mouth 2 (Two) Times a Day.             HYDROcodone-acetaminophen (NORCO) 5-325 MG per tablet  Take 1 tablet by mouth Every 6 (Six) Hours As Needed for Moderate Pain (4-6) or Severe Pain (7-10).             hydrOXYzine (VISTARIL) 50 MG capsule  Take 1 capsule by mouth every 6 (six) hours as needed for itching.             ipratropium (ATROVENT) 0.02 % nebulizer solution  Take 500 mcg by nebulization 4 (Four) Times a Day As Needed for Wheezing or Shortness of Air.             LORazepam (ATIVAN) 0.5 MG tablet  Take 1 tablet by mouth Every 8 (Eight) Hours As Needed for anxiety. Take 1 tab 1 hoour before MRI and repeat x 1 at time of test             meclizine (ANTIVERT) 25 MG tablet  TAKE ONE TABLET BY MOUTH THREE TIMES DAILY AS NEEDED FOR DIZZINESS             montelukast (SINGULAIR) 10 MG tablet  Take 1 tablet by mouth every night.             NIFEdipine XL (PROCARDIA XL) 60 MG 24 hr tablet  TAKE TWO TABLETS BY MOUTH DAILY             omeprazole (priLOSEC) 40 MG capsule  TAKE ONE CAPSULE BY MOUTH EVERY DAY             ondansetron ODT (ZOFRAN-ODT) 8 MG disintegrating tablet  Take 1 tablet by mouth every 6 (six) hours as needed for nausea or vomiting.             pentazocine-naloxone (TALWIN NX) 50-0.5 MG per tablet  Take 2 tablets by mouth Every 4 (Four) Hours As Needed for Mild Pain (1-3) (for up to 90 days).             polyethylene glycol (MIRALAX) powder  Take one capful w/liquid daily             promethazine-codeine (PHENERGAN with CODEINE) 6.25-10 MG/5ML syrup  Take 5 mL by mouth 4 (Four) Times a Day As Needed for cough.             tiZANidine (ZANAFLEX) 4 MG tablet  Take 1 tablet by mouth at night as needed for muscle spasms.              tiZANidine (ZANAFLEX) 4 MG tablet  TAKE ONE TABLET BY MOUTH AT BEDTIME AS NEEDED FOR MUSCLE SPASMS             vitamin B-12 (VITAMIN B-12) 1000 MCG tablet  Take 1 tablet by mouth Daily.                 Home Medication List  Prior to Admission medications    Medication Sig Start Date End Date Taking? Authorizing Provider   ethacrynic acid (EDECRIN) 25 MG tablet Take 25 mg by mouth 2 (Two) Times a Day.   Yes Historical Provider, MD   ipratropium (ATROVENT) 0.02 % nebulizer solution Take 500 mcg by nebulization 4 (Four) Times a Day As Needed for Wheezing or Shortness of Air.   Yes Historical Provider, MD   albuterol (PROAIR HFA) 108 (90 BASE) MCG/ACT inhaler ProAir  (90 Base) MCG/ACT Inhalation Aerosol Solution; Patient Sig: ProAir  (90 Base) MCG/ACT Inhalation Aerosol Solution ; 8; 0; 18-Jun-2014; Active 6/18/14   Historical Provider, MD   aspirin 81 MG tablet Take 162 mg by mouth Daily. 6/26/14   Historical Provider, MD   budesonide (PULMICORT FLEXHALER) 180 MCG/ACT inhaler Pulmicort Flexhaler 180 MCG/ACT Inhalation Aerosol Powder Breath Activated; Patient Sig: Pulmicort Flexhaler 180 MCG/ACT Inhalation Aerosol Powder Breath Activated ; 0; 26-Jun-2014; Active 6/26/14   Historical Provider, MD   butalbital-acetaminophen-caffeine (FIORICET, ESGIC) -40 MG per tablet Take 2 tablets by mouth Every 4 (Four) Hours As Needed for Headache.  Patient taking differently: Take 1 tablet by mouth Every 4 (Four) Hours As Needed for Headache. 3/27/17   Óscar Cadet MD   cefdinir (OMNICEF) 300 MG capsule Take 1 capsule by mouth 2 (Two) Times a Day. 4/11/17   Óscar Cadet MD   Cholecalciferol (VITAMIN D) 1000 UNITS tablet Take  by mouth. 6/26/14   Historical Provider, MD   CloNIDine (CATAPRES) 0.1 MG tablet Take 1 tablet by mouth 2 (Two) Times a Day. Schedule bid 1 and then [id number] take extra #1 if systolic BP > 160 and take 2 if systolic Bp>180  Patient taking differently: Take 0.2 mg by mouth 2 (Two)  Times a Day. Schedule bid 1 and then [id number] take extra #1 if systolic BP > 160 and take 2 if systolic Bp>180 1/19/17   Óscar Cadet MD   CloNIDine (CATAPRES) 0.2 MG tablet Take 1 tablet by mouth Every 4 (Four) Hours As Needed (prn for sbp >= 180). 4/11/17   Óscar Cadet MD   CloNIDine (CATAPRES) 0.2 MG tablet Take 1 tablet by mouth Every 8 (Eight) Hours. 4/11/17   Óscar Cadet MD   diphenhydrAMINE (BENADRYL) 50 MG tablet Take 1 tablet by mouth every 4 (four) hours as needed for itching or allergies. 7/25/16   Óscar Cadet MD   doxycycline (DORYX) 100 MG enteric coated tablet Take 1 tablet by mouth 2 (Two) Times a Day. 2/1/17   Óscar Cadet MD   doxycycline (DORYX) 100 MG enteric coated tablet Take 1 tablet by mouth 2 (Two) Times a Day. 2/13/17   Óscar Cadet MD   doxycycline (DORYX) 100 MG enteric coated tablet TAKE ONE TABLET BY MOUTH TWICE DAILY 3/24/17   Óscar Cadet MD   eplerenone (INSPRA) 50 MG tablet Take 1 tablet by mouth Every 12 (Twelve) Hours. 4/11/17   Óscar Cadet MD   HYDROcodone-acetaminophen (NORCO) 5-325 MG per tablet Take 1 tablet by mouth Every 6 (Six) Hours As Needed for Moderate Pain (4-6) or Severe Pain (7-10). 4/11/17   Óscar Cadet MD   hydrOXYzine (VISTARIL) 50 MG capsule Take 1 capsule by mouth every 6 (six) hours as needed for itching. 9/8/16   Óscar Cadet MD   levoFLOXacin (LEVAQUIN) 500 MG tablet Take 500 mg by mouth Daily.    Historical Provider, MD   LORazepam (ATIVAN) 0.5 MG tablet Take 1 tablet by mouth Every 8 (Eight) Hours As Needed for anxiety. Take 1 tab 1 hoour before MRI and repeat x 1 at time of test 11/11/16   Óscar Cadet MD   meclizine (ANTIVERT) 25 MG tablet TAKE ONE TABLET BY MOUTH THREE TIMES DAILY AS NEEDED FOR DIZZINESS 1/13/17   Óscar Cadet MD   MethylPREDNISolone (MEDROL, CRISTIAN,) 4 MG tablet Take as directed on package instructions. 1/3/17   Raoul Luke III, PA   montelukast (SINGULAIR) 10 MG tablet Take 1  tablet by mouth every night. 9/8/16   Óscar Cadet MD   NIFEdipine XL (PROCARDIA XL) 60 MG 24 hr tablet TAKE TWO TABLETS BY MOUTH DAILY  Patient taking differently: one oral bid 2/10/17   Óscar Cadet MD   NIFEdipine XL (PROCARDIA XL) 60 MG 24 hr tablet Take 1 tablet by mouth Daily. 2/17/17   Óscar Cadet MD   NIFEdipine XL (PROCARDIA XL) 60 MG 24 hr tablet TAKE TWO TABLETS BY MOUTH DAILY 3/24/17   Óscar Cadet MD   omeprazole (priLOSEC) 40 MG capsule TAKE ONE CAPSULE BY MOUTH EVERY DAY 3/24/17   Óscar Cadet MD   ondansetron ODT (ZOFRAN-ODT) 8 MG disintegrating tablet Take 1 tablet by mouth every 6 (six) hours as needed for nausea or vomiting. 9/8/16   Óscar Cadet MD   pentazocine-naloxone (TALWIN NX) 50-0.5 MG per tablet Take 2 tablets by mouth Every 4 (Four) Hours As Needed for Mild Pain (1-3) (for up to 90 days).  Patient taking differently: Take 1 tablet by mouth Every 4 (Four) Hours As Needed for Mild Pain (1-3) (for up to 90 days). 3/27/17   Óscar Cadet MD   polyethylene glycol (MIRALAX) powder Take one capful w/liquid daily 9/8/16   Ósacr Cadet MD   predniSONE (DELTASONE) 10 MG tablet TAKE ONE TABLET BY MOUTH DAILY AS DIRECTED 3/24/17   Óscar Cadet MD   promethazine-codeine (PHENERGAN with CODEINE) 6.25-10 MG/5ML syrup Take 5 mL by mouth 4 (Four) Times a Day As Needed for cough. 12/9/16   Óscar Cadet MD   tiZANidine (ZANAFLEX) 4 MG tablet Take 1 tablet by mouth at night as needed for muscle spasms.  Patient taking differently: Take 2 mg by mouth 2 (Two) Times a Day. 7/25/16   Óscar Cadet MD   tiZANidine (ZANAFLEX) 4 MG tablet TAKE ONE TABLET BY MOUTH AT BEDTIME AS NEEDED FOR MUSCLE SPASMS 3/24/17   Óscar Cadet MD   tobramycin-dexamethasone (TOBRADEX) 0.3-0.1 % ophthalmic suspension Administer 1 drop to both eyes every 4 (four) hours while awake. 7/25/16   Óscar Cadet MD   vitamin B-12 (VITAMIN B-12) 1000 MCG tablet Take 1 tablet by mouth Daily.  4/11/17   Óscar Cadet MD   HYDROcodone-acetaminophen (NORCO) 5-325 MG per tablet Take 1 tablet by mouth Every 6 (Six) Hours As Needed for moderate pain (4-6) or severe pain (7-10). 12/9/16 4/11/17  Óscar Cadet MD       Discharge Diet   Diet Orders (active)     Start     Ordered    04/06/17 1142  Diet Regular; Cardiac  Diet Effective Now      04/06/17 1141          Activity at Discharge      Follow-up Appointments  Future Appointments  Date Time Provider Department Center   5/3/2017 3:00 PM Óscar Cadet MD [facility] None   7/24/2017 2:00 PM Óscar Cadet MD [facility] None     Additional Instructions for the Follow-ups that You Need to Schedule     Discharge Follow-Up With Specified Provider    As directed    To:  Dr. Solis's office with their new partner in am next week (not on 19th)  Hydralazine induced lupus   Follow Up:  2 Weeks       Discharge Follow-up with PCP    As directed    Follow Up Details:  DR Cadet 1-2 week                 Therapy Orders  Physical therapy, Occupational Therapy, and Speech Therapy to evaluate and treat.  Nutrition/Dieticien to follow weights and determine further nutritional needs.    Test Results Pending at Discharge   Order Current Status    Aldosterone / Renin Ratio In process          Óscar Cadet MD    Time: Discharge 25 min   No

## 2023-05-17 NOTE — OB PROVIDER DELIVERY SUMMARY - NSPROVIDERDELIVERYNOTE_OBGYN_ALL_OB_FT
Procedure: pLTCS  Preop Dx: IUP@33w6d  Baby A: viable M infant, transverse back down rotated to levon breech presentation, weight 2020g, APGARS 9/9  Baby B: viable M infant, transverse back down rotated to levon breech presentation, weight 2560g, APGARS 7/9  grossly normal uterus, b/l tubes and ovaries, 2 submucosal fibroids located near hysterotomy on posterior endometrial wall, <2cm anterior subserosal fibroid palpated near fundus.   Layers of uterine closure:  hysterotomy closed in 1 layer, uterus not exteriorized.   peritoneum closed with 2.0 chromic gut suture  rectus muscle reapproximated with 2.0 chromic gut suture    Complications: none  Specimen: none   Findings: as above  Hemostatic/intraoperative agents: none  825/2L/300  Dictation #: Procedure: pLTCS  Preop Dx: IUP@33w6d  Baby A: viable M infant, transverse back down rotated to levon breech presentation, weight 2020g, APGARS 9/9  Baby B: viable M infant, transverse back down rotated to levon breech presentation, weight 2560g, APGARS 7/9  grossly normal uterus, b/l tubes and ovaries, 2 submucosal fibroids located near hysterotomy on posterior endometrial wall, <2cm anterior subserosal fibroid palpated near fundus.   Layers of uterine closure:  hysterotomy closed in 1 layer, uterus not exteriorized.   peritoneum closed with 2.0 chromic gut suture  rectus muscle reapproximated with 2.0 chromic gut suture    Complications: none  Specimen: none   Findings: as above  Hemostatic/intraoperative agents: none  825/2L/300  Dictation #:[id number] Procedure: pLTCS  Preop Dx: IUP@33w6d  Baby A: viable M infant, transverse back down rotated to levon breech presentation, weight 2020g, APGARS 9/9  Baby B: viable M infant, transverse back down rotated to levon breech presentation, weight 2560g, APGARS 7/9  grossly normal uterus, b/l tubes and ovaries, 2 submucosal fibroids located near hysterotomy on posterior endometrial wall, <2cm anterior subserosal fibroid palpated near fundus.   Layers of uterine closure:  hysterotomy closed in 1 layer, uterus not exteriorized.   peritoneum closed with 2.0 chromic gut suture  rectus muscle reapproximated with 2.0 chromic gut suture    Complications: none  Specimen: none   Findings: as above  Hemostatic/intraoperative agents: TXA 1g  825/2L/300  Dictation #:[id number]

## 2023-05-17 NOTE — PRE-ANESTHESIA EVALUATION ADULT - NSANTHPMHFT_GEN_ALL_CORE
s/p operative hysteroscopy x 3   s/p laparoscopic myomectomy  s/p open B/L ovarian cystectomy  s/p robotic assist laparoscopy myomectomy.  hx/o hypothyroidism, on replacement.

## 2023-05-17 NOTE — OB PROVIDER H&P - NS PANP COMMENT GEN_ALL_CORE FT
37 yo G1 @ 33w6d with mono-di gestation and h/o multiple uterine surgeries presents with PPROM    - 2u PRBC on hold  - PPROM - ampicillin ordered  -I counseled the patient and her partner via  Krish #[id number] regarding risk of bleeding, infection and injury to abdominal and pelvic structures, including the fact that hemorrhage risk is further increased in twin gestations.  Patient has significant iron deficiency anemia and I counseled her her risk for needing a blood transfusion is higher, which she is willing to accept.  I also counseled her that risk of bladder, bowel and blood vessel injury is increased due to multiple prior abdominal surgeries.  Patient had coffee and crackers at 8am but as we are having difficulty tracing fetuses on monitor I feel the risk of undiagnosed fetal distress outweighs the risk of anesthesia complications at this point.    Marie Thomas MD

## 2023-05-17 NOTE — OB RN PATIENT PROFILE - ANESTHESIA, PREVIOUS REACTION, PROFILE
STEPHANIE met with the patient to discuss discharge plan and to complete the
Re-admission interview. The patient recently discharged from the hospital,
1/19, and returned home with his wife. Home Health services through [facility] were to be started upon discharge. STEPHANIE received a phone call from
Bryanna at [facility]. Bryanna reports that the patient has bathing
services through them, but they were unable to start PT/OT/skilled nursing,
due to not receiving discharge orders. The patient lives in [place] with his
wife, Anita (ph#[phone]). He reports needing assistance with bathing
and has a walker and is on 2 liters of continuous oxygen. He could not recall
what DME company he receives it from. The patient's PCP is Dr. Benjamin Cotton and
he receives his medications on [place]. He reports no difficulties
obtaining his meds. The patient's DPOA-HC is in EMR and it designates his
wife. STEPHANIE discussed home health services again. The patient is agreeable to
home health. STEPHANIE provided the patient with Medicare.gov's list of home health
agencies that serve [place]. The patient chose [facility] again. STEPHANIE
contacted and faxed a referral to Harini at [facility]. Harini
reports that they can accept the patient. STEPHANIE to continue to follow. none

## 2023-05-17 NOTE — OB RN INTRAOPERATIVE NOTE - NSINITIALFHR_OBGYN_ALL_OB_FT
145 / 138
36 y/o F from home, living with ,  with a PMHx of chronic alcohol abuse ( multiple admissions for alcohol intoxication) , alcohol withdrawal seizure, Depression and no significant PSHx BIB  to ED with reported seizure this morning at 05:00am for a min.  In ED;  Pt was given ativan and versed, blood alcohol level was 414  pt has bruise on her R eye, pt states she was hit by her dog when she was cutting her nails last week  Pt was admitted for Seizure and alcohol intoxication

## 2023-05-17 NOTE — OB RN DELIVERY SUMMARY - NSSELHIDDEN_OBGYN_ALL_OB_FT
[NS_DeliveryAttending1_OBGYN_ALL_OB_FT:[id number]],[NS_DeliveryAssist1_OBGYN_ALL_OB_FT:[id number]],[NS_DeliveryRN_OBGYN_ALL_OB_FT:[id number]],[NS_CirculateRN2_OBGYN_ALL_OB_FT:[id number]] [NS_DeliveryAttending1_OBGYN_ALL_OB_FT:[id number]],[NS_DeliveryAssist1_OBGYN_ALL_OB_FT:[id number]],[NS_DeliveryRN_OBGYN_ALL_OB_FT:[id number]],[NS_CirculateRN2_OBGYN_ALL_OB_FT:[id number]]

## 2023-05-17 NOTE — OB PROVIDER H&P - NSMODPPHRISK_OBGYN_A_OB
Prior  birth(s) or uterine surgery/Over-distended uterus: Multiple gestation, polyhydramnios, Macrosomia/AMA - over 35 years of age/Aspirin use in pregnancy
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
## 2023-05-17 NOTE — OB PROVIDER H&P - HISTORY OF PRESENT ILLNESS
Please advise on birth control for patient.  
OB PA Admission Note    39 yo g1 2 33w6d by EDC of 6/29 with mono/di gestation from IVF pregnancy with implantation of 1 embryo and h/o multiple myomectomies presents from office with rupture of membranes at 9am today     +FM x 1  Denies CTX/VB    PNC: IVF - 1 embryo implanted    GBS not Done  EFW (4/27/23) A- 1640 and B 1809    PMH: thyroid nodules  Meds: PNV, ASA 162mg daily, synthroid 25mcg daily  All: NKA  GYN:   h/o open ovarian cystectomy 2015  vaginal myomectomy x 2  robotic assisted myomectomy x 1  laparoscopic myomectomy x 1  abdominal myomectomy x 1    denies STI/abn pap    OB: primigravida  PSH: as per GYN history  Social: denies toxic habits  Psych: denies history

## 2023-05-17 NOTE — OB RN PATIENT PROFILE - FALL HARM RISK - UNIVERSAL INTERVENTIONS
Bed in lowest position, wheels locked, appropriate side rails in place/Call bell, personal items and telephone in reach/Instruct patient to call for assistance before getting out of bed or chair/Non-slip footwear when patient is out of bed/[place] to call system/Physically safe environment - no spills, clutter or unnecessary equipment/Purposeful Proactive Rounding/Room/bathroom lighting operational, light cord in reach

## 2023-05-17 NOTE — OB PROVIDER DELIVERY SUMMARY - NSSELHIDDEN_OBGYN_ALL_OB_FT
[NS_DeliveryAttending1_OBGYN_ALL_OB_FT:[id number]],[NS_DeliveryAssist1_OBGYN_ALL_OB_FT:[id number]]

## 2023-05-17 NOTE — OB RN INTRAOPERATIVE NOTE - NSSELHIDDEN_OBGYN_ALL_OB_FT
[NS_DeliveryAttending1_OBGYN_ALL_OB_FT:[id number]],[NS_DeliveryAssist1_OBGYN_ALL_OB_FT:[id number]]

## 2023-05-17 NOTE — OB RN DELIVERY SUMMARY - NS_SEPSISRSKCALC_OBGYN_ALL_OB_FT
Unable to calculate the EOS score due to gestational age being less than 34 weeks or more than 43 weeks.  Rupture of membranes must be entered above.  'Type of intrapartum antibiotics' must be entered above.   Unable to calculate the EOS score due to gestational age being less than 34 weeks or more than 43 weeks.

## 2023-05-17 NOTE — PRE-ANESTHESIA EVALUATION ADULT - NSANTHADDINFOFT_GEN_ALL_CORE
CSE, intrathecal durmorph explained to pt in detail;  all discussion translated to Croatian by pt's  (per pt's preference).  Risks include but not limited to HA, failure, nv injury.  All questions answered.

## 2023-05-18 ENCOUNTER — APPOINTMENT (OUTPATIENT)
Dept: OBGYN | Facility: CLINIC | Age: 38
End: 2023-05-18

## 2023-05-18 LAB
HCT VFR BLD CALC: 30.4 % — LOW (ref 34.5–45)
HGB BLD-MCNC: 9.6 G/DL — LOW (ref 11.5–15.5)
MCHC RBC-ENTMCNC: 28.2 PG — SIGNIFICANT CHANGE UP (ref 27–34)
MCHC RBC-ENTMCNC: 31.6 GM/DL — LOW (ref 32–36)
MCV RBC AUTO: 89.4 FL — SIGNIFICANT CHANGE UP (ref 80–100)
NRBC # BLD: 0 /100 WBCS — SIGNIFICANT CHANGE UP (ref 0–0)
PLATELET # BLD AUTO: 116 K/UL — LOW (ref 150–400)
RBC # BLD: 3.4 M/UL — LOW (ref 3.8–5.2)
RBC # FLD: 23.5 % — HIGH (ref 10.3–14.5)
WBC # BLD: 12.96 K/UL — HIGH (ref 3.8–10.5)
WBC # FLD AUTO: 12.96 K/UL — HIGH (ref 3.8–10.5)

## 2023-05-18 RX ORDER — MAGNESIUM HYDROXIDE 400 MG/1
30 TABLET, CHEWABLE ORAL DAILY
Refills: 0 | Status: DISCONTINUED | OUTPATIENT
Start: 2023-05-18 | End: 2023-05-21

## 2023-05-18 RX ORDER — IBUPROFEN 200 MG
600 TABLET ORAL EVERY 6 HOURS
Refills: 0 | Status: DISCONTINUED | OUTPATIENT
Start: 2023-05-18 | End: 2023-05-21

## 2023-05-18 RX ORDER — SIMETHICONE 80 MG/1
80 TABLET, CHEWABLE ORAL DAILY
Refills: 0 | Status: DISCONTINUED | OUTPATIENT
Start: 2023-05-18 | End: 2023-05-21

## 2023-05-18 RX ADMIN — Medication 30 MILLIGRAM(S): at 11:18

## 2023-05-18 RX ADMIN — Medication 600 MILLIGRAM(S): at 18:15

## 2023-05-18 RX ADMIN — NALBUPHINE HYDROCHLORIDE 2.5 MILLIGRAM(S): 10 INJECTION, SOLUTION INTRAMUSCULAR; INTRAVENOUS; SUBCUTANEOUS at 01:30

## 2023-05-18 RX ADMIN — Medication 30 MILLIGRAM(S): at 11:48

## 2023-05-18 RX ADMIN — NALBUPHINE HYDROCHLORIDE 2.5 MILLIGRAM(S): 10 INJECTION, SOLUTION INTRAMUSCULAR; INTRAVENOUS; SUBCUTANEOUS at 02:00

## 2023-05-18 RX ADMIN — Medication 600 MILLIGRAM(S): at 23:30

## 2023-05-18 RX ADMIN — Medication 30 MILLIGRAM(S): at 07:16

## 2023-05-18 RX ADMIN — Medication 30 MILLIGRAM(S): at 06:29

## 2023-05-18 RX ADMIN — Medication 975 MILLIGRAM(S): at 15:29

## 2023-05-18 RX ADMIN — Medication 975 MILLIGRAM(S): at 08:25

## 2023-05-18 RX ADMIN — NALBUPHINE HYDROCHLORIDE 2.5 MILLIGRAM(S): 10 INJECTION, SOLUTION INTRAMUSCULAR; INTRAVENOUS; SUBCUTANEOUS at 11:20

## 2023-05-18 RX ADMIN — Medication 975 MILLIGRAM(S): at 20:43

## 2023-05-18 RX ADMIN — HEPARIN SODIUM 5000 UNIT(S): 5000 INJECTION INTRAVENOUS; SUBCUTANEOUS at 08:27

## 2023-05-18 RX ADMIN — Medication 975 MILLIGRAM(S): at 03:06

## 2023-05-18 RX ADMIN — Medication 975 MILLIGRAM(S): at 08:55

## 2023-05-18 RX ADMIN — Medication 975 MILLIGRAM(S): at 21:30

## 2023-05-18 RX ADMIN — SIMETHICONE 80 MILLIGRAM(S): 80 TABLET, CHEWABLE ORAL at 23:30

## 2023-05-18 RX ADMIN — Medication 975 MILLIGRAM(S): at 14:59

## 2023-05-18 RX ADMIN — Medication 30 MILLIGRAM(S): at 00:35

## 2023-05-18 RX ADMIN — MAGNESIUM HYDROXIDE 30 MILLILITER(S): 400 TABLET, CHEWABLE ORAL at 23:37

## 2023-05-18 RX ADMIN — HEPARIN SODIUM 5000 UNIT(S): 5000 INJECTION INTRAVENOUS; SUBCUTANEOUS at 20:44

## 2023-05-18 RX ADMIN — Medication 25 MICROGRAM(S): at 06:29

## 2023-05-18 RX ADMIN — Medication 975 MILLIGRAM(S): at 03:50

## 2023-05-18 NOTE — PROGRESS NOTE ADULT - SUBJECTIVE AND OBJECTIVE BOX
OB Progress Note:  Delivery, POD#1    S: Patient feels well. Pain is well controlled. She is tolerating a regular diet. Not yet passing flatus. She is voiding spontaneously and ambulating without difficulty. Endorses light vaginal bleeding, soaking < 1 pad/hour. Denies CP/SOB, lightheadedness/dizziness, N/V.    MEDICATIONS  (STANDING):  acetaminophen     Tablet .. 975 milliGRAM(s) Oral <User Schedule>  heparin   Injectable 5000 Unit(s) SubCutaneous every 12 hours  ibuprofen  Tablet. 600 milliGRAM(s) Oral every 6 hours  ketorolac   Injectable 30 milliGRAM(s) IV Push every 6 hours  levothyroxine 25 MICROGram(s) Oral daily  morphine PF Spinal 0.1 milliGRAM(s) IntraThecal. once  oxytocin Infusion 333.333 milliUNIT(s)/Min (1000 mL/Hr) IV Continuous <Continuous>      MEDICATIONS  (PRN):  dexAMETHasone  Injectable 4 milliGRAM(s) IV Push every 6 hours PRN Nausea  nalbuphine Injectable 2.5 milliGRAM(s) IV Push every 6 hours PRN Pruritus  naloxone Injectable 0.1 milliGRAM(s) IV Push every 3 minutes PRN For ANY of the following changes in patient status:  A. Breaths Per Minute LESS THAN 10, B. Oxygen saturation LESS THAN 90%, C. Sedation score of 6 for Stop After: 4 Times  ondansetron Injectable 4 milliGRAM(s) IV Push every 6 hours PRN Nausea  oxyCODONE    IR 5 milliGRAM(s) Oral once PRN Moderate to Severe Pain (4-10)  oxyCODONE    IR 10 milliGRAM(s) Oral every 3 hours PRN Moderate Pain (4 - 6)  oxyCODONE    IR 5 milliGRAM(s) Oral every 3 hours PRN Mild Pain (1 - 3)  oxyCODONE    IR 5 milliGRAM(s) Oral every 3 hours PRN Moderate to Severe Pain (4-10)      O:  Vitals:  Vital Signs Last 24 Hrs  T(C): 36.6 (18 May 2023 05:40), Max: 37 (17 May 2023 22:15)  T(F): 97.8 (18 May 2023 05:40), Max: 98.6 (17 May 2023 22:15)  HR: 65 (18 May 2023 05:40) (65 - 108)  BP: 94/54 (18 May 2023 05:40) (94/54 - 154/62)  BP(mean): 76 (17 May 2023 17:30) (75 - 99)  RR: 18 (18 May 2023 05:40) (18 - 34)  SpO2: 96% (18 May 2023 05:40) (93% - 100%)    Parameters below as of 18 May 2023 05:40  Patient On (Oxygen Delivery Method): room air        Labs:  Blood type: O Positive  Rubella IgG: RPR: Negative                          9.6<L>   12.96<H> >-----------< 116<L>    (  @ 06:16 )             30.4<L>                        12.0   7.19 >-----------< 123<L>    (  @ 11:56 )             36.8        Physical Exam:  General: NAD  Heart: Clinically well-perfused  Lungs: Breathing comfortably on room air  Abdomen: Soft, non-distended, appropriately tender, fundus firm; LT incision c/d/i with steri strips in place  Extremities: No calf edema/tenderness

## 2023-05-18 NOTE — PROGRESS NOTE ADULT - SUBJECTIVE AND OBJECTIVE BOX
Day 1 of Anesthesia Pain Management Service    SUBJECTIVE: Doing ok  Pain Scale Score:          [X] Refer to charted pain scores    THERAPY:    s/p   100mcg PF morphine on 5\17\2023      MEDICATIONS  (STANDING):  acetaminophen     Tablet .. 975 milliGRAM(s) Oral <User Schedule>  heparin   Injectable 5000 Unit(s) SubCutaneous every 12 hours  ibuprofen  Tablet. 600 milliGRAM(s) Oral every 6 hours  ketorolac   Injectable 30 milliGRAM(s) IV Push every 6 hours  levothyroxine 25 MICROGram(s) Oral daily  morphine PF Spinal 0.1 milliGRAM(s) IntraThecal. once  oxytocin Infusion 333.333 milliUNIT(s)/Min (1000 mL/Hr) IV Continuous <Continuous>    MEDICATIONS  (PRN):  dexAMETHasone  Injectable 4 milliGRAM(s) IV Push every 6 hours PRN Nausea  nalbuphine Injectable 2.5 milliGRAM(s) IV Push every 6 hours PRN Pruritus  naloxone Injectable 0.1 milliGRAM(s) IV Push every 3 minutes PRN For ANY of the following changes in patient status:  A. Breaths Per Minute LESS THAN 10, B. Oxygen saturation LESS THAN 90%, C. Sedation score of 6 for Stop After: 4 Times  ondansetron Injectable 4 milliGRAM(s) IV Push every 6 hours PRN Nausea  oxyCODONE    IR 5 milliGRAM(s) Oral every 3 hours PRN Mild Pain (1 - 3)  oxyCODONE    IR 10 milliGRAM(s) Oral every 3 hours PRN Moderate Pain (4 - 6)  oxyCODONE    IR 5 milliGRAM(s) Oral every 3 hours PRN Moderate to Severe Pain (4-10)  oxyCODONE    IR 5 milliGRAM(s) Oral once PRN Moderate to Severe Pain (4-10)      OBJECTIVE:    Sedation:        	[X] Alert	 [ ] Drowsy	[ ] Arousable      [ ] Asleep       [ ] Unresponsive    Side Effects:	[ ] None 	[ ] Nausea	[ ] Vomiting         [X ] Pruritus  		[ ] Weakness            [ ] Numbness	          [ ] Other:    Vital Signs Last 24 Hrs  T(C): 36.7 (18 May 2023 09:15), Max: 37 (17 May 2023 22:15)  T(F): 98 (18 May 2023 09:15), Max: 98.6 (17 May 2023 22:15)  HR: 67 (18 May 2023 09:15) (65 - 108)  BP: 99/60 (18 May 2023 09:15) (94/54 - 154/62)  BP(mean): 76 (17 May 2023 17:30) (75 - 99)  RR: 18 (18 May 2023 09:15) (18 - 34)  SpO2: 99% (18 May 2023 09:15) (93% - 100%)    Parameters below as of 18 May 2023 09:15  Patient On (Oxygen Delivery Method): room air        ASSESSMENT/ PLAN  [X] Patient to be transitioned to prn analgesics after 24 hours  [X] Pain management per primary service, pain service to sign off   [X]Documentation and Verification of current medications

## 2023-05-19 ENCOUNTER — APPOINTMENT (OUTPATIENT)
Dept: ANTEPARTUM | Facility: CLINIC | Age: 38
End: 2023-05-19

## 2023-05-19 RX ORDER — OXYCODONE HYDROCHLORIDE 5 MG/1
5 TABLET ORAL
Refills: 0 | Status: DISCONTINUED | OUTPATIENT
Start: 2023-05-19 | End: 2023-05-21

## 2023-05-19 RX ORDER — ASCORBIC ACID 60 MG
500 TABLET,CHEWABLE ORAL THREE TIMES A DAY
Refills: 0 | Status: DISCONTINUED | OUTPATIENT
Start: 2023-05-19 | End: 2023-05-21

## 2023-05-19 RX ORDER — FERROUS SULFATE 325(65) MG
325 TABLET ORAL THREE TIMES A DAY
Refills: 0 | Status: DISCONTINUED | OUTPATIENT
Start: 2023-05-19 | End: 2023-05-21

## 2023-05-19 RX ADMIN — Medication 600 MILLIGRAM(S): at 17:28

## 2023-05-19 RX ADMIN — Medication 600 MILLIGRAM(S): at 06:45

## 2023-05-19 RX ADMIN — Medication 975 MILLIGRAM(S): at 14:53

## 2023-05-19 RX ADMIN — Medication 600 MILLIGRAM(S): at 17:58

## 2023-05-19 RX ADMIN — HEPARIN SODIUM 5000 UNIT(S): 5000 INJECTION INTRAVENOUS; SUBCUTANEOUS at 08:27

## 2023-05-19 RX ADMIN — OXYCODONE HYDROCHLORIDE 5 MILLIGRAM(S): 5 TABLET ORAL at 12:58

## 2023-05-19 RX ADMIN — OXYCODONE HYDROCHLORIDE 5 MILLIGRAM(S): 5 TABLET ORAL at 22:15

## 2023-05-19 RX ADMIN — Medication 975 MILLIGRAM(S): at 03:30

## 2023-05-19 RX ADMIN — Medication 325 MILLIGRAM(S): at 21:42

## 2023-05-19 RX ADMIN — Medication 600 MILLIGRAM(S): at 00:15

## 2023-05-19 RX ADMIN — Medication 975 MILLIGRAM(S): at 08:27

## 2023-05-19 RX ADMIN — Medication 975 MILLIGRAM(S): at 15:23

## 2023-05-19 RX ADMIN — Medication 600 MILLIGRAM(S): at 11:32

## 2023-05-19 RX ADMIN — Medication 500 MILLIGRAM(S): at 14:53

## 2023-05-19 RX ADMIN — Medication 325 MILLIGRAM(S): at 14:53

## 2023-05-19 RX ADMIN — Medication 975 MILLIGRAM(S): at 08:57

## 2023-05-19 RX ADMIN — Medication 975 MILLIGRAM(S): at 21:42

## 2023-05-19 RX ADMIN — OXYCODONE HYDROCHLORIDE 5 MILLIGRAM(S): 5 TABLET ORAL at 21:42

## 2023-05-19 RX ADMIN — Medication 600 MILLIGRAM(S): at 12:02

## 2023-05-19 RX ADMIN — HEPARIN SODIUM 5000 UNIT(S): 5000 INJECTION INTRAVENOUS; SUBCUTANEOUS at 21:41

## 2023-05-19 RX ADMIN — Medication 600 MILLIGRAM(S): at 06:02

## 2023-05-19 RX ADMIN — Medication 500 MILLIGRAM(S): at 21:42

## 2023-05-19 RX ADMIN — Medication 975 MILLIGRAM(S): at 02:49

## 2023-05-19 RX ADMIN — OXYCODONE HYDROCHLORIDE 5 MILLIGRAM(S): 5 TABLET ORAL at 12:28

## 2023-05-19 RX ADMIN — SIMETHICONE 80 MILLIGRAM(S): 80 TABLET, CHEWABLE ORAL at 12:59

## 2023-05-19 RX ADMIN — Medication 25 MICROGRAM(S): at 06:01

## 2023-05-19 RX ADMIN — Medication 975 MILLIGRAM(S): at 22:15

## 2023-05-19 NOTE — PROGRESS NOTE ADULT - SUBJECTIVE AND OBJECTIVE BOX
R1 Progress Note    Patient seen and examined at bedside, no acute overnight events. Patient with complaint of bilateral leg swelling without pain this AM.  No chest pain, no shortness of breath.  Pain well controlled. Patient is ambulating and tolerating regular diet. Passing flatus, voiding spontaneously. Reports vaginal bleeding decreasing.  Denies CP, SOB, N/V, HA, blurred vision, lightheadedness, dizziness.    Vital Signs Last 24 Hours  T(C): 36.6 (05-19-23 @ 05:49), Max: 36.7 (05-18-23 @ 09:15)  HR: 77 (05-19-23 @ 05:49) (66 - 87)  BP: 120/71 (05-19-23 @ 05:49) (99/60 - 120/71)  RR: 18 (05-19-23 @ 05:49) (18 - 18)  SpO2: 98% (05-19-23 @ 05:49) (96% - 99%)    I&O's Summary    18 May 2023 07:01  -  19 May 2023 07:00  --------------------------------------------------------  IN: 0 mL / OUT: 275 mL / NET: -275 mL        Physical Exam:  General: NAD  Abdomen: Soft, appropriately tender, non-distended, fundus firm  Incision: Pfannenstiel incision CDI  Pelvic: Lochia wnl  Ext: b/l LE pitting edema to mid-shin, nontender to palpation, no erythema or varicosities, no pain to palpation    Labs:    Blood Type: O Positive  Antibody Screen: Negative  RPR: Negative               9.6    12.96 )-----------( 116      ( 05-18 @ 06:16 )             30.4                12.0   7.19  )-----------( 123      ( 05-17 @ 11:56 )             36.8         MEDICATIONS  (STANDING):  acetaminophen     Tablet .. 975 milliGRAM(s) Oral <User Schedule>  heparin   Injectable 5000 Unit(s) SubCutaneous every 12 hours  ibuprofen  Tablet. 600 milliGRAM(s) Oral every 6 hours  ketorolac   Injectable 30 milliGRAM(s) IV Push every 6 hours  levothyroxine 25 MICROGram(s) Oral daily  oxytocin Infusion 333.333 milliUNIT(s)/Min (1000 mL/Hr) IV Continuous <Continuous>    MEDICATIONS  (PRN):  magnesium hydroxide Suspension 30 milliLiter(s) Oral daily PRN Constipation  oxyCODONE    IR 5 milliGRAM(s) Oral once PRN Moderate to Severe Pain (4-10)  oxyCODONE    IR 5 milliGRAM(s) Oral every 3 hours PRN Moderate to Severe Pain (4-10)  simethicone 80 milliGRAM(s) Chew daily PRN Gas   R1 Progress Note    Patient seen and examined at bedside, no acute overnight events. Patient with complaint of bilateral leg swelling without pain this AM.  No chest pain, no shortness of breath.  Endorses some gas pain improved with medications.  Pain well controlled. Patient is ambulating and tolerating regular diet. Passing flatus, voiding spontaneously. Reports vaginal bleeding decreasing.  Denies CP, SOB, N/V, HA, blurred vision, lightheadedness, dizziness.    Vital Signs Last 24 Hours  T(C): 36.6 (05-19-23 @ 05:49), Max: 36.7 (05-18-23 @ 09:15)  HR: 77 (05-19-23 @ 05:49) (66 - 87)  BP: 120/71 (05-19-23 @ 05:49) (99/60 - 120/71)  RR: 18 (05-19-23 @ 05:49) (18 - 18)  SpO2: 98% (05-19-23 @ 05:49) (96% - 99%)    I&O's Summary    18 May 2023 07:01  -  19 May 2023 07:00  --------------------------------------------------------  IN: 0 mL / OUT: 275 mL / NET: -275 mL        Physical Exam:  General: NAD  Abdomen: Soft, appropriately tender, non-distended, fundus firm  Incision: Pfannenstiel incision CDI, covered with steri strips  Pelvic: Lochia wnl  Ext: b/l LE pitting edema to mid-shin, nontender to palpation, no erythema or varicosities, no pain to palpation    Labs:    Blood Type: O Positive  Antibody Screen: Negative  RPR: Negative               9.6    12.96 )-----------( 116      ( 05-18 @ 06:16 )             30.4                12.0   7.19  )-----------( 123      ( 05-17 @ 11:56 )             36.8         MEDICATIONS  (STANDING):  acetaminophen     Tablet .. 975 milliGRAM(s) Oral <User Schedule>  heparin   Injectable 5000 Unit(s) SubCutaneous every 12 hours  ibuprofen  Tablet. 600 milliGRAM(s) Oral every 6 hours  ketorolac   Injectable 30 milliGRAM(s) IV Push every 6 hours  levothyroxine 25 MICROGram(s) Oral daily  oxytocin Infusion 333.333 milliUNIT(s)/Min (1000 mL/Hr) IV Continuous <Continuous>    MEDICATIONS  (PRN):  magnesium hydroxide Suspension 30 milliLiter(s) Oral daily PRN Constipation  oxyCODONE    IR 5 milliGRAM(s) Oral once PRN Moderate to Severe Pain (4-10)  oxyCODONE    IR 5 milliGRAM(s) Oral every 3 hours PRN Moderate to Severe Pain (4-10)  simethicone 80 milliGRAM(s) Chew daily PRN Gas

## 2023-05-20 RX ADMIN — OXYCODONE HYDROCHLORIDE 5 MILLIGRAM(S): 5 TABLET ORAL at 21:45

## 2023-05-20 RX ADMIN — SIMETHICONE 80 MILLIGRAM(S): 80 TABLET, CHEWABLE ORAL at 03:08

## 2023-05-20 RX ADMIN — Medication 600 MILLIGRAM(S): at 05:25

## 2023-05-20 RX ADMIN — Medication 600 MILLIGRAM(S): at 12:45

## 2023-05-20 RX ADMIN — Medication 600 MILLIGRAM(S): at 18:40

## 2023-05-20 RX ADMIN — Medication 600 MILLIGRAM(S): at 00:03

## 2023-05-20 RX ADMIN — Medication 325 MILLIGRAM(S): at 05:25

## 2023-05-20 RX ADMIN — OXYCODONE HYDROCHLORIDE 5 MILLIGRAM(S): 5 TABLET ORAL at 15:31

## 2023-05-20 RX ADMIN — Medication 600 MILLIGRAM(S): at 12:15

## 2023-05-20 RX ADMIN — OXYCODONE HYDROCHLORIDE 5 MILLIGRAM(S): 5 TABLET ORAL at 15:01

## 2023-05-20 RX ADMIN — OXYCODONE HYDROCHLORIDE 5 MILLIGRAM(S): 5 TABLET ORAL at 21:14

## 2023-05-20 RX ADMIN — Medication 600 MILLIGRAM(S): at 18:10

## 2023-05-20 RX ADMIN — Medication 975 MILLIGRAM(S): at 21:45

## 2023-05-20 RX ADMIN — Medication 975 MILLIGRAM(S): at 09:22

## 2023-05-20 RX ADMIN — OXYCODONE HYDROCHLORIDE 5 MILLIGRAM(S): 5 TABLET ORAL at 18:10

## 2023-05-20 RX ADMIN — Medication 500 MILLIGRAM(S): at 05:26

## 2023-05-20 RX ADMIN — Medication 975 MILLIGRAM(S): at 21:13

## 2023-05-20 RX ADMIN — Medication 600 MILLIGRAM(S): at 00:35

## 2023-05-20 RX ADMIN — Medication 25 MICROGRAM(S): at 05:25

## 2023-05-20 RX ADMIN — Medication 600 MILLIGRAM(S): at 23:50

## 2023-05-20 RX ADMIN — Medication 975 MILLIGRAM(S): at 15:01

## 2023-05-20 RX ADMIN — OXYCODONE HYDROCHLORIDE 5 MILLIGRAM(S): 5 TABLET ORAL at 18:40

## 2023-05-20 RX ADMIN — Medication 975 MILLIGRAM(S): at 03:06

## 2023-05-20 RX ADMIN — Medication 500 MILLIGRAM(S): at 21:15

## 2023-05-20 RX ADMIN — HEPARIN SODIUM 5000 UNIT(S): 5000 INJECTION INTRAVENOUS; SUBCUTANEOUS at 08:52

## 2023-05-20 RX ADMIN — HEPARIN SODIUM 5000 UNIT(S): 5000 INJECTION INTRAVENOUS; SUBCUTANEOUS at 21:14

## 2023-05-20 RX ADMIN — Medication 500 MILLIGRAM(S): at 15:03

## 2023-05-20 RX ADMIN — Medication 325 MILLIGRAM(S): at 15:01

## 2023-05-20 RX ADMIN — Medication 975 MILLIGRAM(S): at 08:52

## 2023-05-20 RX ADMIN — Medication 975 MILLIGRAM(S): at 04:05

## 2023-05-20 RX ADMIN — Medication 600 MILLIGRAM(S): at 06:00

## 2023-05-20 RX ADMIN — Medication 975 MILLIGRAM(S): at 15:31

## 2023-05-20 RX ADMIN — Medication 325 MILLIGRAM(S): at 21:14

## 2023-05-20 NOTE — LACTATION INITIAL EVALUATION - NS LACT CON REASON FOR REQ
33.6 week twins in nicu for prematurity/primaparous mom/premature infant/follow up consultation
33 week twins/primaparous mom/premature infant/NICU admission
primaparous mom/premature infant/NICU admission/follow up consultation

## 2023-05-20 NOTE — LACTATION INITIAL EVALUATION - BREAST ASSESSMENT (LEFT)
Verbal review only, declined observation at this time.
medium/soft/ALBERT letdown/widely spaced
small/soft

## 2023-05-20 NOTE — LACTATION INITIAL EVALUATION - INTERVENTION OUTCOME
verbalizes understanding/demonstrates understanding of teaching/good return demonstration/needs met
verbalizes understanding/demonstrates understanding of teaching/good return demonstration/needs met/Lactation team to follow up
States she has a pump at home. Aware of LC availability./verbalizes understanding/demonstrates understanding of teaching/good return demonstration/needs met

## 2023-05-20 NOTE — LACTATION INITIAL EVALUATION - LACTATION INTERVENTIONS
#[id number]  Mother initiated pumping earlier this morning, pump consult given and taught pumping guidelines, kit hygiene, storage and handling.  Written instructions given in Samoan as well. Mother very excited at being able to extract colostrum with hand expression./initiate/review hand expression/initiate/review pumping guidelines and safe milk handling/initiate/review supplementation plan due to medical indications
met with mother at bedside. LL assistance declined. Pumping guidelines reviewed verbally. importance of frequency and impact on supply reviewed. safe transport/storage of EHM from home reviewed. pump rental encouraged. needs met at this time./initiate/review pumping guidelines and safe milk handling
Reinforced pumping guidelines, storage & handling of EHM.  observed mom hand expressing, helped with technique, observed pumping, mom following guidelines correctly,  encouraged more frequent pumping to meet 8 times per 24 hours. and rationale. parents cleaning pump parts according to CDC guidelines. mother requested that father of babies translate for her, declined /initiate/review hand expression/initiate/review pumping guidelines and safe milk handling

## 2023-05-20 NOTE — LACTATION INITIAL EVALUATION - AS EVIDENCED BY
observation/prematurity/multiple birth/infant  from mother
patient stated/prematurity/multiple birth/infant  from mother
patient stated/observation/prematurity/multiple birth/infant  from mother

## 2023-05-20 NOTE — PROGRESS NOTE ADULT - SUBJECTIVE AND OBJECTIVE BOX
OB Progress Note:  Delivery, POD#3    S: Patient feels well. Pain is well controlled. She is tolerating a regular diet and passing flatus. She is voiding spontaneously and ambulating without difficulty. Endorses light vaginal bleeding, soaking < 1 pad/hour. Denies CP/SOB, lightheadedness/dizziness, N/V.    MEDICATIONS  (STANDING):  acetaminophen     Tablet .. 975 milliGRAM(s) Oral <User Schedule>  ascorbic acid 500 milliGRAM(s) Oral three times a day  ferrous    sulfate 325 milliGRAM(s) Oral three times a day  heparin   Injectable 5000 Unit(s) SubCutaneous every 12 hours  ibuprofen  Tablet. 600 milliGRAM(s) Oral every 6 hours  levothyroxine 25 MICROGram(s) Oral daily  oxytocin Infusion 333.333 milliUNIT(s)/Min (1000 mL/Hr) IV Continuous <Continuous>      MEDICATIONS  (PRN):  magnesium hydroxide Suspension 30 milliLiter(s) Oral daily PRN Constipation  oxyCODONE    IR 5 milliGRAM(s) Oral once PRN Moderate to Severe Pain (4-10)  oxyCODONE    IR 5 milliGRAM(s) Oral every 3 hours PRN Moderate to Severe Pain (4-10)  simethicone 80 milliGRAM(s) Chew daily PRN Gas      O:  Vitals:  Vital Signs Last 24 Hrs  T(C): 36.7 (20 May 2023 05:06), Max: 36.7 (20 May 2023 05:06)  T(F): 98 (20 May 2023 05:06), Max: 98 (20 May 2023 05:06)  HR: 82 (20 May 2023 05:06) (82 - 95)  BP: 115/76 (20 May 2023 05:06) (115/76 - 129/81)  BP(mean): --  RR: 18 (20 May 2023 05:06) (18 - 18)  SpO2: 96% (20 May 2023 05:06) (96% - 100%)    Parameters below as of 20 May 2023 05:06  Patient On (Oxygen Delivery Method): room air        Labs:  Blood type: O Positive  Rubella IgG: RPR: Negative                          9.6<L>   12.96<H> >-----------< 116<L>    (  @ 06:16 )             30.4<L>                        12.0   7.19 >-----------< 123<L>    ( -17 @ 11:56 )             36.8        Physical Exam:  General: NAD  Heart: Clinically well-perfused  Lungs: Breathing comfortably on room air  Abdomen: Soft, non-distended, appropriately tender, fundus firm; low transverse incision c/d/i w/ steri strips in place

## 2023-05-21 ENCOUNTER — TRANSCRIPTION ENCOUNTER (OUTPATIENT)
Age: 38
End: 2023-05-21

## 2023-05-21 VITALS
DIASTOLIC BLOOD PRESSURE: 80 MMHG | TEMPERATURE: 99 F | SYSTOLIC BLOOD PRESSURE: 130 MMHG | RESPIRATION RATE: 18 BRPM | OXYGEN SATURATION: 100 % | HEART RATE: 77 BPM

## 2023-05-21 PROCEDURE — 36415 COLL VENOUS BLD VENIPUNCTURE: CPT

## 2023-05-21 PROCEDURE — 88307 TISSUE EXAM BY PATHOLOGIST: CPT

## 2023-05-21 PROCEDURE — 86769 SARS-COV-2 COVID-19 ANTIBODY: CPT

## 2023-05-21 PROCEDURE — 85027 COMPLETE CBC AUTOMATED: CPT

## 2023-05-21 PROCEDURE — 86850 RBC ANTIBODY SCREEN: CPT

## 2023-05-21 PROCEDURE — 59025 FETAL NON-STRESS TEST: CPT

## 2023-05-21 PROCEDURE — 86780 TREPONEMA PALLIDUM: CPT

## 2023-05-21 PROCEDURE — 85025 COMPLETE CBC W/AUTO DIFF WBC: CPT

## 2023-05-21 PROCEDURE — 86901 BLOOD TYPING SEROLOGIC RH(D): CPT

## 2023-05-21 PROCEDURE — 86900 BLOOD TYPING SEROLOGIC ABO: CPT

## 2023-05-21 PROCEDURE — 59050 FETAL MONITOR W/REPORT: CPT

## 2023-05-21 RX ORDER — ASCORBIC ACID 60 MG
1 TABLET,CHEWABLE ORAL
Qty: 0 | Refills: 0 | DISCHARGE
Start: 2023-05-21

## 2023-05-21 RX ORDER — LEVOTHYROXINE SODIUM 125 MCG
1 TABLET ORAL
Qty: 0 | Refills: 0 | DISCHARGE
Start: 2023-05-21

## 2023-05-21 RX ORDER — IBUPROFEN 200 MG
1 TABLET ORAL
Qty: 0 | Refills: 0 | DISCHARGE
Start: 2023-05-21

## 2023-05-21 RX ORDER — IBUPROFEN 200 MG
1 TABLET ORAL
Qty: 0 | Refills: 0 | DISCHARGE

## 2023-05-21 RX ORDER — FERROUS SULFATE 325(65) MG
1 TABLET ORAL
Qty: 0 | Refills: 0 | DISCHARGE
Start: 2023-05-21

## 2023-05-21 RX ORDER — ACETAMINOPHEN 500 MG
2 TABLET ORAL
Qty: 0 | Refills: 0 | DISCHARGE

## 2023-05-21 RX ORDER — OXYCODONE HYDROCHLORIDE 5 MG/1
1 TABLET ORAL
Qty: 15 | Refills: 0
Start: 2023-05-21

## 2023-05-21 RX ORDER — ACETAMINOPHEN 500 MG
3 TABLET ORAL
Qty: 90 | Refills: 1
Start: 2023-05-21

## 2023-05-21 RX ORDER — FOLIC ACID/VIT B COMPLEX AND C 400 MCG
0 TABLET ORAL
Qty: 0 | Refills: 0 | DISCHARGE

## 2023-05-21 RX ADMIN — Medication 25 MICROGRAM(S): at 06:15

## 2023-05-21 RX ADMIN — Medication 600 MILLIGRAM(S): at 12:44

## 2023-05-21 RX ADMIN — Medication 975 MILLIGRAM(S): at 03:11

## 2023-05-21 RX ADMIN — Medication 975 MILLIGRAM(S): at 08:42

## 2023-05-21 RX ADMIN — HEPARIN SODIUM 5000 UNIT(S): 5000 INJECTION INTRAVENOUS; SUBCUTANEOUS at 08:42

## 2023-05-21 RX ADMIN — Medication 500 MILLIGRAM(S): at 21:28

## 2023-05-21 RX ADMIN — OXYCODONE HYDROCHLORIDE 5 MILLIGRAM(S): 5 TABLET ORAL at 03:11

## 2023-05-21 RX ADMIN — Medication 975 MILLIGRAM(S): at 03:45

## 2023-05-21 RX ADMIN — Medication 975 MILLIGRAM(S): at 14:36

## 2023-05-21 RX ADMIN — Medication 500 MILLIGRAM(S): at 14:37

## 2023-05-21 RX ADMIN — Medication 600 MILLIGRAM(S): at 13:14

## 2023-05-21 RX ADMIN — Medication 600 MILLIGRAM(S): at 17:47

## 2023-05-21 RX ADMIN — OXYCODONE HYDROCHLORIDE 5 MILLIGRAM(S): 5 TABLET ORAL at 12:45

## 2023-05-21 RX ADMIN — Medication 975 MILLIGRAM(S): at 21:55

## 2023-05-21 RX ADMIN — Medication 975 MILLIGRAM(S): at 09:12

## 2023-05-21 RX ADMIN — OXYCODONE HYDROCHLORIDE 5 MILLIGRAM(S): 5 TABLET ORAL at 03:45

## 2023-05-21 RX ADMIN — Medication 600 MILLIGRAM(S): at 06:14

## 2023-05-21 RX ADMIN — Medication 975 MILLIGRAM(S): at 17:51

## 2023-05-21 RX ADMIN — Medication 325 MILLIGRAM(S): at 14:37

## 2023-05-21 RX ADMIN — Medication 325 MILLIGRAM(S): at 21:28

## 2023-05-21 RX ADMIN — Medication 500 MILLIGRAM(S): at 06:14

## 2023-05-21 RX ADMIN — Medication 600 MILLIGRAM(S): at 00:25

## 2023-05-21 RX ADMIN — Medication 325 MILLIGRAM(S): at 06:14

## 2023-05-21 RX ADMIN — OXYCODONE HYDROCHLORIDE 5 MILLIGRAM(S): 5 TABLET ORAL at 13:14

## 2023-05-21 RX ADMIN — Medication 975 MILLIGRAM(S): at 21:28

## 2023-05-21 NOTE — DISCHARGE NOTE OB - CARE PROVIDER_API CALL
Janet Santoro)  Obstetrics and Gynecology  [address] [place] [address]  Phone: [phone]  Fax: [phone]  Follow Up Time: 2 weeks

## 2023-05-21 NOTE — DISCHARGE NOTE OB - HOSPITAL COURSE
uncomplicated  delivery of mono-di twins for PPROM at 33w6d. uncomplicated postpartum course. dc home on pod4 in stable condition.

## 2023-05-21 NOTE — DISCHARGE NOTE OB - MEDICATION SUMMARY - MEDICATIONS TO TAKE
I will START or STAY ON the medications listed below when I get home from the hospital:    ibuprofen 600 mg oral tablet  -- 1 tab(s) by mouth every 6 hours  -- Indication: For  delivery delivered    Tylenol 325 mg oral tablet  -- 3 tab(s) by mouth every 6 hours as needed for  mild pain  -- Indication: For  delivery delivered    oxyCODONE 5 mg oral tablet  -- 1 tab(s) by mouth every 6 hours as needed for  severe pain MDD: 4  -- Indication: For  delivery delivered    ferrous sulfate 325 mg (65 mg elemental iron) oral tablet  -- 1 tab(s) by mouth 3 times a day  -- Indication: For  delivery delivered    levothyroxine 25 mcg (0.025 mg) oral tablet  -- 1 tab(s) by mouth once a day  -- Indication: For  delivery delivered    multivitamin  -- 1 tablet by mouth once   -- Indication: For  delivery delivered    ascorbic acid 500 mg oral tablet  -- 1 tab(s) by mouth 3 times a day  -- Indication: For  delivery delivered    Vitamin D3 25 mcg (1000 intl units) oral tablet  -- 1 tab(s) by mouth once a day  -- Indication: For  delivery delivered

## 2023-05-21 NOTE — PROGRESS NOTE ADULT - ASSESSMENT
A/P: 39yo  POD#3 s/p pLTCS for mono/di TIUP with breech presentation, uncomplicated,  cc.    #PP  - Pain well controlled, continue Motrin, Tylenol, and Oxycodone PRN  - Increase ambulation, SCDs when not ambulating  - Continue regular diet  - H/H drop appropriate for EBL, patient asymptomatic and hemodynamically stable  - Heparin 5000u BID for prophylaxis    Sinks PGY1
A/P: 37yo  POD#4 s/p pLTCS for mono/di TIUP with breech presentation, uncomplicated,  cc.    #PP  - Pain well controlled, continue Motrin, Tylenol, and Oxycodone PRN  - Increase ambulation, SCDs when not ambulating  - Continue regular diet  - H/H drop appropriate for EBL, patient asymptomatic and hemodynamically stable  - Heparin 5000u BID for prophylaxis    E Chucky PGY2
A/P: 39yo  POD#1 s/p pLTCS for mono/di TIUP with breech presentation.  Patient is stable and doing well post-operatively.     #PP  - Pain well controlled, continue Motrin, Tylenol, and Oxycodone PRN  - Increase ambulation, SCDs when not ambulating  - Continue regular diet  - H/H drop appropriate for EBL, patient asymptomatic and hemodynamically stable  - Heparin 5000u BID for prophylaxis    Brittani Mooney PGY1  Interpretation by patient's partner at bedside.  offered and declined.
A/P: 37yo  POD#2 s/p pLTCS for mono/di TIUP with breech presentation.  This AM patient with complaint of leg swelling. Patient is stable and doing well post-operatively.     #Leg swelling  - Equal bilaterally without tenderness to palpation  - No chest pain/shortness of breath  - Encouraged ambulation, elevation lower extremities    #PP  - Pain well controlled, continue Motrin, Tylenol, and Oxycodone PRN  - Increase ambulation, SCDs when not ambulating  - Continue regular diet  - H/H drop appropriate for EBL, patient asymptomatic and hemodynamically stable  - Heparin 5000u BID for prophylaxis    MATTIE Lira PGY1

## 2023-05-21 NOTE — DISCHARGE NOTE OB - PATIENT PORTAL LINK FT
You can access the FollowMyHealth Patient Portal offered by [facility] by registering at the following website: [email or web address]. By joining [facility]’s FollowMyHealth portal, you will also be able to view your health information using other applications (apps) compatible with our system.

## 2023-05-21 NOTE — DISCHARGE NOTE OB - MATERIALS PROVIDED
Vaccinations/[facility]  Screening Program/  Immunization Record/Breastfeeding Mother’s Support Group Information/Guide to Postpartum Care/[facility] Hearing Screen Program/Back To Sleep Handout/Shaken Baby Prevention Handout/Breastfeeding Guide and Packet/Birth Certificate Instructions/Discharge Medication Information for Patients and Families Pocket Guide

## 2023-05-21 NOTE — PROGRESS NOTE ADULT - ATTENDING COMMENTS
Patient seen and examined. Agree with above.
pt seen and examined.  overall doing well. routine pp care.     gideon rose
pt seen and examined. overall doing well. stable to DE home today. reviewed pp precautions. to f/ in office in two weeks or sooner trevin rose

## 2023-05-21 NOTE — DISCHARGE NOTE OB - MEDICATION SUMMARY - MEDICATIONS TO STOP TAKING
I will STOP taking the medications listed below when I get home from the hospital:    vitamin A 8000 units oral capsule    Estrace 2 mg oral tablet  -- 1 tab(s) by mouth once a day   -- Do not take this drug if you are pregnant.  It is very important that you take or use this exactly as directed.  Do not skip doses or discontinue unless directed by your doctor.    Prometrium 200 mg oral capsule  -- 1 cap(s) by mouth once a day   -- Do not take this drug if you are pregnant.  May cause drowsiness or dizziness.

## 2023-05-21 NOTE — DISCHARGE NOTE OB - NS MD DC FALL RISK RISK
For information on Fall & Injury Prevention, visit: [email or web address] OR  [email or web address] OR  https://www.cdc.gov/steadi/patient.html

## 2023-05-21 NOTE — PROGRESS NOTE ADULT - SUBJECTIVE AND OBJECTIVE BOX
OB Postpartum Note:  Delivery, POD#4    S: 37yo POD#4 s/p pLTCS 2/2 mono-di breech twins. The patient feels well.  Pain is well controlled. She is tolerating a regular diet and passing flatus. She is voiding spontaneously, and ambulating without difficulty. Denies CP/SOB. Denies lightheadedness/dizziness. Denies N/V.    O:  Vitals:  Vital Signs Last 24 Hrs  T(C): 36.8 (20 May 2023 21:15), Max: 36.8 (20 May 2023 21:15)  T(F): 98.3 (20 May 2023 21:15), Max: 98.3 (20 May 2023 21:15)  HR: 76 (20 May 2023 21:15) (76 - 82)  BP: 119/81 (20 May 2023 21:15) (115/76 - 119/82)  BP(mean): --  RR: 17 (20 May 2023 21:15) (17 - 18)  SpO2: 99% (20 May 2023 21:15) (96% - 99%)    Parameters below as of 20 May 2023 21:15  Patient On (Oxygen Delivery Method): room air        MEDICATIONS  (STANDING):  acetaminophen     Tablet .. 975 milliGRAM(s) Oral <User Schedule>  ascorbic acid 500 milliGRAM(s) Oral three times a day  ferrous    sulfate 325 milliGRAM(s) Oral three times a day  heparin   Injectable 5000 Unit(s) SubCutaneous every 12 hours  ibuprofen  Tablet. 600 milliGRAM(s) Oral every 6 hours  levothyroxine 25 MICROGram(s) Oral daily  oxytocin Infusion 333.333 milliUNIT(s)/Min (1000 mL/Hr) IV Continuous <Continuous>    MEDICATIONS  (PRN):  magnesium hydroxide Suspension 30 milliLiter(s) Oral daily PRN Constipation  oxyCODONE    IR 5 milliGRAM(s) Oral once PRN Moderate to Severe Pain (4-10)  oxyCODONE    IR 5 milliGRAM(s) Oral every 3 hours PRN Moderate to Severe Pain (4-10)  simethicone 80 milliGRAM(s) Chew daily PRN Gas      LABS:  Blood type: O Positive  Rubella IgG: RPR: Negative                          9.6<L>   12.96<H> >-----------< 116<L>    ( 18 @ 06:16 )             30.4<L>        Physical exam:  Gen: NAD  Abdomen: Soft, nontender, no distension , firm uterine fundus at umbilicus.  Incision: Clean, dry, and intact   Pelvic: Normal lochia noted  Ext: No calf tenderness   OB Postpartum Note:  Delivery, POD#4    S: 37yo POD#4 s/p pLTCS 2/2 mono-di breech twins. The patient feels well.  Pain is well controlled. She is tolerating a regular diet but is not passing flatus. She is voiding spontaneously, and ambulating without difficulty. Denies CP/SOB. Denies lightheadedness/dizziness. Denies N/V.    O:  Vitals:  Vital Signs Last 24 Hrs  T(C): 36.8 (20 May 2023 21:15), Max: 36.8 (20 May 2023 21:15)  T(F): 98.3 (20 May 2023 21:15), Max: 98.3 (20 May 2023 21:15)  HR: 76 (20 May 2023 21:15) (76 - 82)  BP: 119/81 (20 May 2023 21:15) (115/76 - 119/82)  BP(mean): --  RR: 17 (20 May 2023 21:15) (17 - 18)  SpO2: 99% (20 May 2023 21:15) (96% - 99%)    Parameters below as of 20 May 2023 21:15  Patient On (Oxygen Delivery Method): room air        MEDICATIONS  (STANDING):  acetaminophen     Tablet .. 975 milliGRAM(s) Oral <User Schedule>  ascorbic acid 500 milliGRAM(s) Oral three times a day  ferrous    sulfate 325 milliGRAM(s) Oral three times a day  heparin   Injectable 5000 Unit(s) SubCutaneous every 12 hours  ibuprofen  Tablet. 600 milliGRAM(s) Oral every 6 hours  levothyroxine 25 MICROGram(s) Oral daily  oxytocin Infusion 333.333 milliUNIT(s)/Min (1000 mL/Hr) IV Continuous <Continuous>    MEDICATIONS  (PRN):  magnesium hydroxide Suspension 30 milliLiter(s) Oral daily PRN Constipation  oxyCODONE    IR 5 milliGRAM(s) Oral once PRN Moderate to Severe Pain (4-10)  oxyCODONE    IR 5 milliGRAM(s) Oral every 3 hours PRN Moderate to Severe Pain (4-10)  simethicone 80 milliGRAM(s) Chew daily PRN Gas      LABS:  Blood type: O Positive  Rubella IgG: RPR: Negative                          9.6<L>   12.96<H> >-----------< 116<L>    ( 05-18 @ 06:16 )             30.4<L>        Physical exam:  Gen: NAD  Abdomen: Soft, nontender, no distension , firm uterine fundus at umbilicus.  Incision: Clean, dry, and intact   Pelvic: Normal lochia noted  Ext: No calf tenderness

## 2023-05-23 ENCOUNTER — APPOINTMENT (OUTPATIENT)
Dept: OBGYN | Facility: CLINIC | Age: 38
End: 2023-05-23

## 2023-05-25 ENCOUNTER — APPOINTMENT (OUTPATIENT)
Dept: ANTEPARTUM | Facility: CLINIC | Age: 38
End: 2023-05-25

## 2023-05-31 ENCOUNTER — APPOINTMENT (OUTPATIENT)
Dept: OBGYN | Facility: CLINIC | Age: 38
End: 2023-05-31
Payer: COMMERCIAL

## 2023-05-31 VITALS
RESPIRATION RATE: 17 BRPM | HEART RATE: 80 BPM | WEIGHT: 144 LBS | DIASTOLIC BLOOD PRESSURE: 86 MMHG | SYSTOLIC BLOOD PRESSURE: 135 MMHG | HEIGHT: 62 IN | OXYGEN SATURATION: 99 % | BODY MASS INDEX: 26.5 KG/M2

## 2023-05-31 PROCEDURE — 0503F POSTPARTUM CARE VISIT: CPT

## 2023-06-01 ENCOUNTER — APPOINTMENT (OUTPATIENT)
Dept: ANTEPARTUM | Facility: CLINIC | Age: 38
End: 2023-06-01

## 2023-06-01 NOTE — HISTORY OF PRESENT ILLNESS
[Delivery Date: ___] : on [unfilled] [Wt. ___] : weighing [unfilled] [Breastfeeding] : currently nursing [Postpartum Follow Up] : postpartum follow up [Primary C/S] : delivered by  section [None] : No associated symptoms are reported [Clean/Dry/Intact] : clean, dry and intact [Mild] : mild vaginal bleeding [Not Done] : Examination of breasts not done [Awake] : awake [Alert] : alert [Oriented x3] : oriented to person, place, and time [Doing Well] : is doing well [No Sign of Infection] : is showing no signs of infection [Excellent Pain Control] : has excellent pain control [No Sabetha] : to avoid sexual intercourse [No Sports] : not to participate in sports [Complications:___] : no complications [S/Sx PP Depression] : no signs/symptoms of postpartum depression [Erythema] : not erythematous [Cervix Sample Taken] : cervical sample not taken for a Pap smear [Acute Distress] : no acute distress [FreeTextEntry8] : 39 yo female pt s/p c/s [de-identified] : c/s May 17, 2023 for PPROM at 33 and 6 weeks, twin boys by . one baby is already home, another is coming home friday [de-identified] : pain in left hand [de-identified] : She is currently breast feeding and pumping [de-identified] : abd soft, nt, nd  [de-identified] : 39 yo female pt s/p c/s. Stable. Removed steri strips   [de-identified] : rto 4 weeks, advised wrist brace, referral for hand surgeon if not improved

## 2023-06-05 ENCOUNTER — APPOINTMENT (OUTPATIENT)
Dept: OBGYN | Facility: HOSPITAL | Age: 38
End: 2023-06-05

## 2023-06-14 ENCOUNTER — NON-APPOINTMENT (OUTPATIENT)
Age: 38
End: 2023-06-14

## 2023-06-28 ENCOUNTER — APPOINTMENT (OUTPATIENT)
Dept: OBGYN | Facility: CLINIC | Age: 38
End: 2023-06-28
Payer: COMMERCIAL

## 2023-06-28 VITALS
BODY MASS INDEX: 26.13 KG/M2 | WEIGHT: 142 LBS | DIASTOLIC BLOOD PRESSURE: 100 MMHG | SYSTOLIC BLOOD PRESSURE: 140 MMHG | HEIGHT: 62 IN

## 2023-06-28 PROCEDURE — 0503F POSTPARTUM CARE VISIT: CPT

## 2023-06-28 NOTE — HISTORY OF PRESENT ILLNESS
[Delivery Date: ___] : on [unfilled] [Primary C/S] : delivered by  section [Multiples: ___] : Delivery History: [unfilled] babies [Clean/Dry/Intact] : clean, dry and intact [Back to Normal] : is back to normal in size [None] : no vaginal bleeding [Normal] : the vagina was normal [Examination Of The Breasts] : breasts are normal [Doing Well] : is doing well [No Sign of Infection] : is showing no signs of infection [Breastfeeding] : currently nursing [Erythema] : not erythematous [Swelling] : not swollen [FreeTextEntry8] : 6wk PPV s/p c/s. Pt is doing well with no complaints. [de-identified] : PPROM @33 and 6 weeks - Delivered by  [de-identified] : Discussed BC options. Pt is cleared for all normal activities. RTO in 3 months for annual; will do pelvic sono at annual

## 2023-09-28 ENCOUNTER — APPOINTMENT (OUTPATIENT)
Dept: OBGYN | Facility: CLINIC | Age: 38
End: 2023-09-28
Payer: COMMERCIAL

## 2023-09-28 VITALS
WEIGHT: 155.63 LBS | SYSTOLIC BLOOD PRESSURE: 150 MMHG | HEIGHT: 62 IN | DIASTOLIC BLOOD PRESSURE: 90 MMHG | BODY MASS INDEX: 28.64 KG/M2

## 2023-09-28 DIAGNOSIS — Z01.419 ENCOUNTER FOR GYNECOLOGICAL EXAMINATION (GENERAL) (ROUTINE) W/OUT ABNORMAL FINDINGS: ICD-10-CM

## 2023-09-28 DIAGNOSIS — Z11.51 ENCOUNTER FOR SCREENING FOR HUMAN PAPILLOMAVIRUS (HPV): ICD-10-CM

## 2023-09-28 DIAGNOSIS — N80.9 ENDOMETRIOSIS, UNSPECIFIED: ICD-10-CM

## 2023-09-28 DIAGNOSIS — D25.9 LEIOMYOMA OF UTERUS, UNSPECIFIED: ICD-10-CM

## 2023-09-28 PROCEDURE — 99395 PREV VISIT EST AGE 18-39: CPT

## 2023-10-02 RX ORDER — NORETHINDRONE 0.35 MG/1
0.35 TABLET ORAL
Qty: 3 | Refills: 3 | Status: ACTIVE | COMMUNITY
Start: 2023-10-02 | End: 1900-01-01

## 2023-12-31 PROBLEM — Z11.3 ENCOUNTER FOR SCREENING EXAMINATION FOR SEXUALLY TRANSMITTED DISEASE: Status: RESOLVED | Noted: 2022-11-22 | Resolved: 2022-12-06

## 2024-02-27 ENCOUNTER — NON-APPOINTMENT (OUTPATIENT)
Age: 39
End: 2024-02-27

## 2024-02-27 DIAGNOSIS — Z78.9 OTHER SPECIFIED HEALTH STATUS: ICD-10-CM

## 2024-04-11 NOTE — OB NEONATOLOGY/PEDIATRICIAN DELIVERY SUMMARY - NSPHYSICALEXAMDETAILSB_OBGYN_ALL_OB_FT
-psych following, c/w Klonopin 0.5 mg po BID, Remeron 15 mg po QHS, Lyrica 25 mg po TID  -wean of klonopin as above   -Gabapentin 100mg PRN for anxiety as per psych
nasal flaring

## 2024-05-14 LAB
CYTOLOGY CVX/VAG DOC THIN PREP: NORMAL
HPV HIGH+LOW RISK DNA PNL CVX: NOT DETECTED

## 2024-09-17 ENCOUNTER — RX RENEWAL (OUTPATIENT)
Age: 39
End: 2024-09-17

## 2024-09-17 RX ORDER — NORETHINDRONE 0.35 MG/1
0.35 TABLET ORAL
Qty: 84 | Refills: 0 | Status: ACTIVE | COMMUNITY
Start: 2024-09-17 | End: 1900-01-01

## 2024-12-09 ENCOUNTER — RX RENEWAL (OUTPATIENT)
Age: 39
End: 2024-12-09

## 2025-03-14 NOTE — PROVIDER CONTACT NOTE (OTHER) - DATE AND TIME:
19-May-2023 04:08
Patient seen and evaluated, staff consulted, chart, labs, meds reviewed. No acute events overnight. Pt appears in good spirits, somewhat anxious about wound care at home.  at bedside providing support. Denies SI/HI.

## 2025-03-21 ENCOUNTER — RX RENEWAL (OUTPATIENT)
Age: 40
End: 2025-03-21

## 2025-07-07 ENCOUNTER — RX RENEWAL (OUTPATIENT)
Age: 40
End: 2025-07-07

## (undated) DEVICE — SOL IRR POUR NS 0.9% 500ML

## (undated) DEVICE — NSA-THUNDERBEAT GENERATOR 12332W130010: Type: DURABLE MEDICAL EQUIPMENT

## (undated) DEVICE — SOL IRR BAG NS 0.9% 3000ML

## (undated) DEVICE — PACK LITHOTOMY

## (undated) DEVICE — ELCTR CUTTING LOOP 24FR 12 DEG 0.35 WIRE

## (undated) DEVICE — SOL INJ NS 0.9% 1000ML

## (undated) DEVICE — GLV 6.5 PROTEXIS (WHITE)

## (undated) DEVICE — DRAPE LIGHT HANDLE COVER (GREEN)

## (undated) DEVICE — DRSG TELFA 3 X 8

## (undated) DEVICE — SOL IRR GLYCINE 1.5% 3000L

## (undated) DEVICE — POSITIONER PATIENT SAFETY STRAP 3X60"

## (undated) DEVICE — DRAPE 1/2 SHEET 40X57"

## (undated) DEVICE — POSITIONER FOAM EGG CRATE ULNAR 2PCS (PINK)

## (undated) DEVICE — WARMING BLANKET UPPER ADULT

## (undated) DEVICE — Device

## (undated) DEVICE — ELCTR HF RESECTION LOOP ANGLED 22.5FR

## (undated) DEVICE — ELCTR PLASMA LOOP LARGE 24FR 12-30 DEG

## (undated) DEVICE — SPECIMEN CONTAINER 100ML